# Patient Record
Sex: MALE | Race: WHITE | NOT HISPANIC OR LATINO | URBAN - METROPOLITAN AREA
[De-identification: names, ages, dates, MRNs, and addresses within clinical notes are randomized per-mention and may not be internally consistent; named-entity substitution may affect disease eponyms.]

---

## 2023-05-30 ENCOUNTER — TELEPHONE (OUTPATIENT)
Dept: PRIMARY CARE | Facility: CLINIC | Age: 73
End: 2023-05-30
Payer: MEDICARE

## 2023-05-30 DIAGNOSIS — N40.1 BENIGN LOCALIZED PROSTATIC HYPERPLASIA WITH LOWER URINARY TRACT SYMPTOMS (LUTS): Primary | ICD-10-CM

## 2023-06-07 ENCOUNTER — OFFICE VISIT (OUTPATIENT)
Dept: PRIMARY CARE | Facility: CLINIC | Age: 73
End: 2023-06-07
Payer: MEDICARE

## 2023-06-07 DIAGNOSIS — N40.1 BENIGN LOCALIZED PROSTATIC HYPERPLASIA WITH LOWER URINARY TRACT SYMPTOMS (LUTS): ICD-10-CM

## 2023-06-07 DIAGNOSIS — K40.90 INGUINAL HERNIA WITHOUT OBSTRUCTION OR GANGRENE, RECURRENCE NOT SPECIFIED, UNSPECIFIED LATERALITY: Primary | ICD-10-CM

## 2023-06-07 PROCEDURE — 99214 OFFICE O/P EST MOD 30 MIN: CPT | Performed by: INTERNAL MEDICINE

## 2023-06-07 RX ORDER — TAMSULOSIN HYDROCHLORIDE 0.4 MG/1
0.4 CAPSULE ORAL DAILY
Qty: 30 CAPSULE | Refills: 11 | Status: SHIPPED | OUTPATIENT
Start: 2023-06-07 | End: 2023-12-01 | Stop reason: SDUPTHER

## 2023-06-07 ASSESSMENT — ENCOUNTER SYMPTOMS
RESPIRATORY NEGATIVE: 1
DYSURIA: 0
FREQUENCY: 1
DIFFICULTY URINATING: 1
CONSTITUTIONAL NEGATIVE: 1
EYES NEGATIVE: 1
CARDIOVASCULAR NEGATIVE: 1
GASTROINTESTINAL NEGATIVE: 1

## 2023-06-07 NOTE — PROGRESS NOTES
Subjective   Patient ID: Raji Duggan is a 72 y.o. male who presents for   Patient endorses noticing swelling in groin on the left side started about two weeks ago endorses it as being just above the testicle.  Waxes and wanes and notes that it's more pronounced when laying down.  Noted some associated increased urinary frequency, less stream, denies pain.  Denies any trauma to the area and no strenuous exercise.  Endorses playing tennis and denies any heavy lifting.  No fevers, chills, or night sweats.  No erythema.  No recent illness.  Denies edema, changes in sensation.        Review of Systems   Constitutional: Negative.    HENT: Negative.     Eyes: Negative.    Respiratory: Negative.     Cardiovascular: Negative.    Gastrointestinal: Negative.    Genitourinary:  Positive for difficulty urinating and frequency. Negative for dysuria.     Objective   Vitals  BP - 165/80 and 169/80 on repeat    Physical Exam  Exam conducted with a chaperone present.   Constitutional:       Appearance: Normal appearance. He is normal weight.   HENT:      Head: Normocephalic and atraumatic.   Eyes:      Extraocular Movements: Extraocular movements intact.      Conjunctiva/sclera: Conjunctivae normal.   Cardiovascular:      Rate and Rhythm: Normal rate and regular rhythm.   Pulmonary:      Effort: Pulmonary effort is normal.      Breath sounds: Normal breath sounds.   Abdominal:      General: Bowel sounds are normal.   Genitourinary:     Comments: Exam with Chaperone done by Dr. Morales (left side groin axis swelling, not able to be visualized on general observation, no erythema, bulging, or tenderness to palpation)  Musculoskeletal:         General: Normal range of motion.      Cervical back: Normal range of motion.   Neurological:      General: No focal deficit present.      Mental Status: He is alert.     Lab Results   Component Value Date    WBC 7.4 11/29/2022    HGB 15.0 11/29/2022    HCT 43.0 11/29/2022    MCV 91 11/29/2022    PLT  201 11/29/2022     Lab Results   Component Value Date    GLUCOSE 81 11/29/2022    CALCIUM 9.0 11/29/2022     11/29/2022    K 4.4 11/29/2022    CO2 26 11/29/2022     11/29/2022    BUN 16 11/29/2022    CREATININE 0.92 11/29/2022       Assessment/Plan   71 yo male with pmhx of urinary frequency and GERD presenting with concern for groin swelling exam concerning for inguinal hernia.  Also noted increasing urinary frequency, slowing stream, pending uro eval of prostate.  Hypertensive to the 160s.    #Groin Swelling c/f inguinal hernia  -referral to general surg for eval    #Urinary Frequency  -pending urology appointment on the 27th  -will prescribe trial of flomax to see if helps symptoms    #HTN  -systolics in the 160s  -counseled on obtaining a home bp monitor and checking at home  -will reassess home measurements and start a single agent     RTC in Nov.

## 2023-06-07 NOTE — PATIENT INSTRUCTIONS
Raji,     Start tamsulosin (Flomax) 1 pill at bedtime until you see urology (Dr. Pineda).   Call the referral line or await a call from  regarding scheduling general surgery visit with Dr. Richard Eaton for what I suspect is an inguinal hernia.   Your blood pressure elevation today has me thinking you may have a new diagnosis of hypertension.  a BP monitor (not a wrist one, but an electronic one) - [employer] is a good brand - check it first thing in the AM a few times a week. If the top number is consistently >140 or the bottom number >85, I would start a low dose once a day BP med at night.   See me for a physical in November!       CL

## 2023-06-10 NOTE — PROGRESS NOTES
I reviewed with the resident the medical history and the resident’s findings on physical examination.  I discussed with the resident the patient’s diagnosis and concur with the treatment plan as documented in the resident note.             Raji Duggan is a 73 yo M presents for acute visit.     1. LUTS   -has uro visit impending   -no meds historically     2. Inguinal mass     3. R shoulder pain s/p ortho eval 9.22     #HM   -flu 2022 UTD, pneumovax UTD, shingrix x2 UTD, tdap 2016   -due screen labs   -cscope 3.2022 ([facility]) - TAs   -EGD normal 3.2022         Farhana Tsai MD

## 2023-08-11 ENCOUNTER — HOSPITAL ENCOUNTER (OUTPATIENT)
Dept: DATA CONVERSION | Facility: HOSPITAL | Age: 73
End: 2023-08-11
Attending: SURGERY | Admitting: SURGERY
Payer: MEDICARE

## 2023-08-11 DIAGNOSIS — G89.29 CHRONIC KNEE PAIN, UNSPECIFIED LATERALITY: Primary | ICD-10-CM

## 2023-08-11 DIAGNOSIS — M25.569 CHRONIC KNEE PAIN, UNSPECIFIED LATERALITY: Primary | ICD-10-CM

## 2023-08-11 DIAGNOSIS — K40.20 BILATERAL INGUINAL HERNIA, WITHOUT OBSTRUCTION OR GANGRENE, NOT SPECIFIED AS RECURRENT: ICD-10-CM

## 2023-08-13 ENCOUNTER — HOSPITAL ENCOUNTER (OUTPATIENT)
Dept: DATA CONVERSION | Facility: HOSPITAL | Age: 73
Discharge: HOME | End: 2023-08-13

## 2023-08-13 DIAGNOSIS — R33.9 RETENTION OF URINE, UNSPECIFIED: ICD-10-CM

## 2023-08-13 DIAGNOSIS — Z98.890 OTHER SPECIFIED POSTPROCEDURAL STATES: ICD-10-CM

## 2023-08-13 LAB
ANION GAP SERPL CALCULATED.3IONS-SCNC: 15 MMOL/L (ref 0–19)
BASOPHILS # BLD AUTO: 0.01 K/UL (ref 0–0.22)
BASOPHILS NFR BLD AUTO: 0.1 % (ref 0–1)
BILIRUB UR QL STRIP.AUTO: NEGATIVE
BUN SERPL-MCNC: 14 MG/DL (ref 8–25)
BUN/CREAT SERPL: 15.6 RATIO (ref 8–21)
CALCIUM SERPL-MCNC: 9.7 MG/DL (ref 8.5–10.4)
CHLORIDE SERPL-SCNC: 103 MMOL/L (ref 97–107)
CLARITY UR: CLEAR
CO2 SERPL-SCNC: 20 MMOL/L (ref 24–31)
COLOR UR: YELLOW
CREAT SERPL-MCNC: 0.9 MG/DL (ref 0.4–1.6)
DEPRECATED RDW RBC AUTO: 40.1 FL (ref 37–54)
DIFFERENTIAL METHOD BLD: ABNORMAL
EOSINOPHIL # BLD AUTO: 0 K/UL (ref 0–0.45)
EOSINOPHIL NFR BLD: 0 % (ref 0–3)
ERYTHROCYTE [DISTWIDTH] IN BLOOD BY AUTOMATED COUNT: 12.2 % (ref 11.7–15)
GFR SERPL CREATININE-BSD FRML MDRD: 91 ML/MIN/1.73 M2
GLUCOSE SERPL-MCNC: 138 MG/DL (ref 65–99)
GLUCOSE UR STRIP.AUTO-MCNC: NEGATIVE MG/DL
HCT VFR BLD AUTO: 41.4 % (ref 41–50)
HGB BLD-MCNC: 14.6 GM/DL (ref 13.5–16.5)
HGB UR QL STRIP.AUTO: ABNORMAL /HPF (ref 0–3)
HGB UR QL: ABNORMAL
IMM GRANULOCYTES # BLD AUTO: 0.01 K/UL (ref 0–0.1)
KETONES UR QL STRIP.AUTO: ABNORMAL
LEUKOCYTE ESTERASE UR QL STRIP.AUTO: NEGATIVE
LYMPHOCYTES # BLD AUTO: 2.22 K/UL (ref 1.2–3.2)
LYMPHOCYTES NFR BLD MANUAL: 22.2 % (ref 20–40)
MCH RBC QN AUTO: 31 PG (ref 26–34)
MCHC RBC AUTO-ENTMCNC: 35.3 % (ref 31–37)
MCV RBC AUTO: 87.9 FL (ref 80–100)
MICROSCOPIC (UA): ABNORMAL
MONOCYTES # BLD AUTO: 0.84 K/UL (ref 0–0.8)
MONOCYTES NFR BLD MANUAL: 8.4 % (ref 0–8)
NEUTROPHILS # BLD AUTO: 6.91 K/UL
NEUTROPHILS # BLD AUTO: 6.91 K/UL (ref 1.8–7.7)
NEUTROPHILS.IMMATURE NFR BLD: 0.1 % (ref 0–1)
NEUTS SEG NFR BLD: 69.2 % (ref 50–70)
NITRITE UR QL STRIP.AUTO: NEGATIVE
PH UR STRIP.AUTO: 5.5 [PH] (ref 4.6–8)
PLATELET # BLD AUTO: 189 K/UL (ref 150–450)
PMV BLD AUTO: 10 CU (ref 7–12.6)
POTASSIUM SERPL-SCNC: 3.9 MMOL/L (ref 3.4–5.1)
PROT UR STRIP.AUTO-MCNC: NEGATIVE MG/DL
RBC # BLD AUTO: 4.71 M/UL (ref 4.5–5.5)
SODIUM SERPL-SCNC: 138 MMOL/L (ref 133–145)
SP GR UR STRIP.AUTO: 1.02 (ref 1–1.03)
URINE CULTURE: ABNORMAL
UROBILINOGEN UR QL STRIP.AUTO: 0.2 MG/DL (ref 0–1)
WBC # BLD AUTO: 10 K/UL (ref 4.5–11)
WBC #/AREA URNS AUTO: ABNORMAL /HPF (ref 0–3)

## 2023-08-14 ENCOUNTER — HOSPITAL ENCOUNTER (OUTPATIENT)
Dept: DATA CONVERSION | Facility: HOSPITAL | Age: 73
Discharge: HOME | End: 2023-08-14

## 2023-08-14 DIAGNOSIS — Z79.899 OTHER LONG TERM (CURRENT) DRUG THERAPY: ICD-10-CM

## 2023-08-14 DIAGNOSIS — R33.9 RETENTION OF URINE, UNSPECIFIED: ICD-10-CM

## 2023-08-22 ENCOUNTER — PATIENT OUTREACH (OUTPATIENT)
Dept: PRIMARY CARE | Facility: CLINIC | Age: 73
End: 2023-08-22
Payer: MEDICARE

## 2023-08-22 DIAGNOSIS — R33.9 URINARY RETENTION: ICD-10-CM

## 2023-08-22 NOTE — PROGRESS NOTES
TCM complete.  Discharge date 8/18/23   2 attempts were made to reach patient to assess needs.   No return call as of this note.   If patient schedules follow up within 14 days of discharge, visit is TCM billable.  Message sent to practice clinical pool to reach out to patient and schedule an appointment within 7-13 days from discharge date.    If patient meets criteria for moderately complex & has follow-up within 14 days-can bill [id number].   If patient meets criteria for highly complex & has follow-up visit within 7 days-can bill [id number].    *virtual follow up needs modifier added (95 or GT)   *AWV AND TCM CAN BE BILLED TOGETHER WITH 25 MODIFIER

## 2023-09-29 VITALS
HEART RATE: 48 BPM | TEMPERATURE: 97 F | RESPIRATION RATE: 16 BRPM | BODY MASS INDEX: 25.28 KG/M2 | DIASTOLIC BLOOD PRESSURE: 67 MMHG | WEIGHT: 180.56 LBS | SYSTOLIC BLOOD PRESSURE: 179 MMHG | HEIGHT: 71 IN

## 2023-09-30 NOTE — H&P
History of Present Illness:   History Present Illness:  Reason for surgery: bilateral inguina hernia   HPI:    72-year-old gentleman with bilateral inguinal hernias.    Past medical history review of systems no previous abdominal surgeries.  Mild reflux and prostatism symptoms    Allergies:        Allergies:  ·  No Known Allergies :     Home Medication Review:   Home Medications Reviewed: yes     Impression/Procedure:   ·  Impression and Planned Procedure: Plan is for laparoscopic bilateral inguinal hernia repair       ERAS (Enhanced Recovery After Surgery):  ·  ERAS Patient: no       Vital Signs:  Temperature C: 36.1 degrees C   Temperature F: 96.9 degrees F   Heart Rate: 48 beats per minute   Respiratory Rate: 16 breath per minute   Blood Pressure Systolic: 179 mm/Hg   Blood Pressure Diastolic: 67 mm/Hg     Physical Exam by System:    Eyes: no icterus   Respiratory/Thorax: normal respiratory effort   Cardiovascular: no significant problems   Gastrointestinal: No scars, no hepatosplenomegaly   Genitourinary: Lateral inguinal hernias left greater  than right   Musculoskeletal: moves all extremities   Neurological: moves all extremities     Consent:   COVID-19 Consent:  ·  COVID-19 Risk Consent Surgeon has reviewed key risks related to the risk of beena COVID-19 and if they contract COVID-19 what the risks are.       Electronic Signatures:  Melvin Eaton)  (Signed 11-Aug-2023 08:47)   Authored: History of Present Illness, Allergies, Home  Medication Review, Impression/Procedure, ERAS, Physical Exam, Consent, Note Completion      Last Updated: 11-Aug-2023 08:47 by Melvin Eaton)

## 2023-10-01 NOTE — OP NOTE
Post Operative Note:     PreOp Diagnosis: Bilateral inguinal hernias   Post-Procedure Diagnosis: Bilateral inguinal hernias   Procedure: 1. Laparoscopic bilateral inguinal hernia  repair   Surgeon: Uma   Resident/Fellow/Other Assistant: Nicolás   Anesthesia: GETA   I.V. Fluids: 200   Estimated Blood Loss (mL): 2   Specimen: no   Complications: none   Findings: Bilateral direct inguinal hernias   Patient Returned To/Condition: PACU to home     Attestation:   Note Completion:  I am a: Resident/Fellow   Attending Attestation I was present for the entire procedure          Electronic Signatures:  Storm Monzon (Resident))  (Signed 11-Aug-2023 11:12)   Authored: Post Operative Note, Note Completion  Melvin Eaton)  (Signed 11-Aug-2023 11:25)   Authored: Note Completion   Co-Signer: Post Operative Note, Note Completion      Last Updated: 11-Aug-2023 11:25 by Melvin Eaton)    26-Apr-2022 12:16

## 2023-11-03 DIAGNOSIS — R49.0 DYSPHONIA: ICD-10-CM

## 2023-11-03 RX ORDER — OMEPRAZOLE 40 MG/1
40 CAPSULE, DELAYED RELEASE ORAL EVERY MORNING
Qty: 90 CAPSULE | Refills: 2 | Status: SHIPPED | OUTPATIENT
Start: 2023-11-03 | End: 2023-12-01 | Stop reason: SDUPTHER

## 2023-11-16 ENCOUNTER — OFFICE VISIT (OUTPATIENT)
Dept: PRIMARY CARE | Facility: CLINIC | Age: 73
End: 2023-11-16
Payer: MEDICARE

## 2023-11-16 VITALS
OXYGEN SATURATION: 99 % | DIASTOLIC BLOOD PRESSURE: 82 MMHG | SYSTOLIC BLOOD PRESSURE: 136 MMHG | BODY MASS INDEX: 25.73 KG/M2 | HEART RATE: 52 BPM | WEIGHT: 184.6 LBS

## 2023-11-16 DIAGNOSIS — J01.00 ACUTE NON-RECURRENT MAXILLARY SINUSITIS: Primary | ICD-10-CM

## 2023-11-16 PROCEDURE — 1036F TOBACCO NON-USER: CPT | Performed by: INTERNAL MEDICINE

## 2023-11-16 PROCEDURE — 1159F MED LIST DOCD IN RCRD: CPT | Performed by: INTERNAL MEDICINE

## 2023-11-16 PROCEDURE — 1160F RVW MEDS BY RX/DR IN RCRD: CPT | Performed by: INTERNAL MEDICINE

## 2023-11-16 PROCEDURE — 1126F AMNT PAIN NOTED NONE PRSNT: CPT | Performed by: INTERNAL MEDICINE

## 2023-11-16 PROCEDURE — 99213 OFFICE O/P EST LOW 20 MIN: CPT | Performed by: INTERNAL MEDICINE

## 2023-11-16 RX ORDER — DOXYCYCLINE 100 MG/1
100 CAPSULE ORAL 2 TIMES DAILY
Qty: 28 CAPSULE | Refills: 0 | Status: SHIPPED | OUTPATIENT
Start: 2023-11-16 | End: 2023-11-30

## 2023-11-16 ASSESSMENT — ENCOUNTER SYMPTOMS
CONSTITUTIONAL NEGATIVE: 1
SINUS COMPLAINT: 1
SINUS PRESSURE: 1
SORE THROAT: 1

## 2023-11-16 ASSESSMENT — PATIENT HEALTH QUESTIONNAIRE - PHQ9
2. FEELING DOWN, DEPRESSED OR HOPELESS: NOT AT ALL
SUM OF ALL RESPONSES TO PHQ9 QUESTIONS 1 AND 2: 0
1. LITTLE INTEREST OR PLEASURE IN DOING THINGS: NOT AT ALL

## 2023-11-16 NOTE — PROGRESS NOTES
Patient ID: Raji Duggan is a 73 y.o. male who presents for Sinus Problem (Head congestion, fatigue ,body aches; was positive for covid a few weeks ago).    /82   Pulse 52   Wt 83.7 kg (184 lb 9.6 oz)   SpO2 99%   BMI 25.73 kg/m²     Sinus Problem  Associated symptoms include congestion, sinus pressure and a sore throat.         I had cold feeling last Monday , he was positive for covid 10/27/2023   , pt have head congestion , fatigue , bodyaches , I have nasal congestion   Stuffiness, post nasal drip ,  sinus pressure , I am not congested in my chest , appetite good , no headache     Subjective     Review of Systems   Constitutional: Negative.    HENT:  Positive for congestion, postnasal drip, sinus pressure and sore throat.    All other systems reviewed and are negative.      Objective     Physical Exam  Vitals and nursing note reviewed.         Lab Results   Component Value Date    WBC 10.6 08/15/2023    HGB 14.6 08/15/2023    HCT 39.9 (L) 08/15/2023    MCV 86 08/15/2023     08/15/2023           Problem List Items Addressed This Visit    None  Visit Diagnoses       Acute non-recurrent maxillary sinusitis    -  Primary    Relevant Medications    doxycycline (Vibramycin) 100 mg capsule               A/P         DOXYCYCLINE 100MG 1 PILL BID FOR 7 DAYS   ADVISED TO DRINK MORE FLUID   FOLLOW UP WITH PCP IF NOT BETTER

## 2023-12-01 ENCOUNTER — OFFICE VISIT (OUTPATIENT)
Dept: PRIMARY CARE | Facility: CLINIC | Age: 73
End: 2023-12-01
Payer: MEDICARE

## 2023-12-01 ENCOUNTER — ANCILLARY PROCEDURE (OUTPATIENT)
Dept: RADIOLOGY | Facility: CLINIC | Age: 73
End: 2023-12-01
Payer: MEDICARE

## 2023-12-01 ENCOUNTER — LAB (OUTPATIENT)
Dept: LAB | Facility: LAB | Age: 73
End: 2023-12-01
Payer: MEDICARE

## 2023-12-01 VITALS — SYSTOLIC BLOOD PRESSURE: 142 MMHG | DIASTOLIC BLOOD PRESSURE: 82 MMHG | BODY MASS INDEX: 25.87 KG/M2 | WEIGHT: 185.6 LBS

## 2023-12-01 DIAGNOSIS — R73.9 BORDERLINE HYPERGLYCEMIA: ICD-10-CM

## 2023-12-01 DIAGNOSIS — I10 BENIGN ESSENTIAL HYPERTENSION: Primary | ICD-10-CM

## 2023-12-01 DIAGNOSIS — E78.5 DYSLIPIDEMIA: ICD-10-CM

## 2023-12-01 DIAGNOSIS — N40.1 BENIGN LOCALIZED PROSTATIC HYPERPLASIA WITH LOWER URINARY TRACT SYMPTOMS (LUTS): ICD-10-CM

## 2023-12-01 DIAGNOSIS — Z12.5 ENCOUNTER FOR PROSTATE CANCER SCREENING: ICD-10-CM

## 2023-12-01 DIAGNOSIS — M25.561 RIGHT KNEE PAIN, UNSPECIFIED CHRONICITY: ICD-10-CM

## 2023-12-01 DIAGNOSIS — R49.0 DYSPHONIA: ICD-10-CM

## 2023-12-01 DIAGNOSIS — Z23 ENCOUNTER FOR VACCINATION: ICD-10-CM

## 2023-12-01 DIAGNOSIS — I10 BENIGN ESSENTIAL HYPERTENSION: ICD-10-CM

## 2023-12-01 LAB
ALBUMIN SERPL BCP-MCNC: 4.4 G/DL (ref 3.4–5)
ALP SERPL-CCNC: 80 U/L (ref 33–136)
ALT SERPL W P-5'-P-CCNC: 29 U/L (ref 10–52)
ANION GAP SERPL CALC-SCNC: 9 MMOL/L (ref 10–20)
AST SERPL W P-5'-P-CCNC: 28 U/L (ref 9–39)
BASOPHILS # BLD AUTO: 0.03 X10*3/UL (ref 0–0.1)
BASOPHILS NFR BLD AUTO: 0.4 %
BILIRUB SERPL-MCNC: 1.8 MG/DL (ref 0–1.2)
BUN SERPL-MCNC: 14 MG/DL (ref 6–23)
CALCIUM SERPL-MCNC: 9.2 MG/DL (ref 8.6–10.6)
CHLORIDE SERPL-SCNC: 107 MMOL/L (ref 98–107)
CHOLEST SERPL-MCNC: 226 MG/DL (ref 0–199)
CHOLESTEROL/HDL RATIO: 3.3
CO2 SERPL-SCNC: 28 MMOL/L (ref 21–32)
CREAT SERPL-MCNC: 1 MG/DL (ref 0.5–1.3)
EOSINOPHIL # BLD AUTO: 0.08 X10*3/UL (ref 0–0.4)
EOSINOPHIL NFR BLD AUTO: 1.1 %
ERYTHROCYTE [DISTWIDTH] IN BLOOD BY AUTOMATED COUNT: 12.7 % (ref 11.5–14.5)
EST. AVERAGE GLUCOSE BLD GHB EST-MCNC: 103 MG/DL
GFR SERPL CREATININE-BSD FRML MDRD: 79 ML/MIN/1.73M*2
GLUCOSE SERPL-MCNC: 86 MG/DL (ref 74–99)
HBA1C MFR BLD: 5.2 %
HCT VFR BLD AUTO: 45.7 % (ref 41–52)
HDLC SERPL-MCNC: 67.8 MG/DL
HGB BLD-MCNC: 15.3 G/DL (ref 13.5–17.5)
IMM GRANULOCYTES # BLD AUTO: 0.02 X10*3/UL (ref 0–0.5)
IMM GRANULOCYTES NFR BLD AUTO: 0.3 % (ref 0–0.9)
LDLC SERPL CALC-MCNC: 135 MG/DL
LYMPHOCYTES # BLD AUTO: 2.82 X10*3/UL (ref 0.8–3)
LYMPHOCYTES NFR BLD AUTO: 40.2 %
MCH RBC QN AUTO: 30.4 PG (ref 26–34)
MCHC RBC AUTO-ENTMCNC: 33.5 G/DL (ref 32–36)
MCV RBC AUTO: 91 FL (ref 80–100)
MONOCYTES # BLD AUTO: 0.81 X10*3/UL (ref 0.05–0.8)
MONOCYTES NFR BLD AUTO: 11.6 %
NEUTROPHILS # BLD AUTO: 3.25 X10*3/UL (ref 1.6–5.5)
NEUTROPHILS NFR BLD AUTO: 46.4 %
NON HDL CHOLESTEROL: 158 MG/DL (ref 0–149)
NRBC BLD-RTO: 0 /100 WBCS (ref 0–0)
PLATELET # BLD AUTO: 191 X10*3/UL (ref 150–450)
POTASSIUM SERPL-SCNC: 4.8 MMOL/L (ref 3.5–5.3)
PROT SERPL-MCNC: 6.4 G/DL (ref 6.4–8.2)
RBC # BLD AUTO: 5.03 X10*6/UL (ref 4.5–5.9)
SODIUM SERPL-SCNC: 139 MMOL/L (ref 136–145)
TRIGL SERPL-MCNC: 115 MG/DL (ref 0–149)
VLDL: 23 MG/DL (ref 0–40)
WBC # BLD AUTO: 7 X10*3/UL (ref 4.4–11.3)

## 2023-12-01 PROCEDURE — 73562 X-RAY EXAM OF KNEE 3: CPT | Mod: RIGHT SIDE | Performed by: RADIOLOGY

## 2023-12-01 PROCEDURE — G0439 PPPS, SUBSEQ VISIT: HCPCS | Performed by: INTERNAL MEDICINE

## 2023-12-01 PROCEDURE — 36415 COLL VENOUS BLD VENIPUNCTURE: CPT

## 2023-12-01 PROCEDURE — 1170F FXNL STATUS ASSESSED: CPT | Performed by: INTERNAL MEDICINE

## 2023-12-01 PROCEDURE — 80061 LIPID PANEL: CPT

## 2023-12-01 PROCEDURE — 1160F RVW MEDS BY RX/DR IN RCRD: CPT | Performed by: INTERNAL MEDICINE

## 2023-12-01 PROCEDURE — 83036 HEMOGLOBIN GLYCOSYLATED A1C: CPT

## 2023-12-01 PROCEDURE — 1159F MED LIST DOCD IN RCRD: CPT | Performed by: INTERNAL MEDICINE

## 2023-12-01 PROCEDURE — 99214 OFFICE O/P EST MOD 30 MIN: CPT | Performed by: INTERNAL MEDICINE

## 2023-12-01 PROCEDURE — G0103 PSA SCREENING: HCPCS

## 2023-12-01 PROCEDURE — 80053 COMPREHEN METABOLIC PANEL: CPT

## 2023-12-01 PROCEDURE — 1036F TOBACCO NON-USER: CPT | Performed by: INTERNAL MEDICINE

## 2023-12-01 PROCEDURE — 3079F DIAST BP 80-89 MM HG: CPT | Performed by: INTERNAL MEDICINE

## 2023-12-01 PROCEDURE — 85025 COMPLETE CBC W/AUTO DIFF WBC: CPT

## 2023-12-01 PROCEDURE — 3077F SYST BP >= 140 MM HG: CPT | Performed by: INTERNAL MEDICINE

## 2023-12-01 PROCEDURE — G0008 ADMIN INFLUENZA VIRUS VAC: HCPCS | Performed by: INTERNAL MEDICINE

## 2023-12-01 PROCEDURE — 73562 X-RAY EXAM OF KNEE 3: CPT | Mod: RT

## 2023-12-01 PROCEDURE — 1126F AMNT PAIN NOTED NONE PRSNT: CPT | Performed by: INTERNAL MEDICINE

## 2023-12-01 PROCEDURE — 90662 IIV NO PRSV INCREASED AG IM: CPT | Performed by: INTERNAL MEDICINE

## 2023-12-01 RX ORDER — OMEPRAZOLE 40 MG/1
40 CAPSULE, DELAYED RELEASE ORAL EVERY MORNING
Qty: 90 CAPSULE | Refills: 0 | Status: SHIPPED | OUTPATIENT
Start: 2023-12-01

## 2023-12-01 RX ORDER — AMLODIPINE BESYLATE AND ATORVASTATIN CALCIUM 5; 20 MG/1; MG/1
1 TABLET, FILM COATED ORAL DAILY
Qty: 90 TABLET | Refills: 0 | Status: SHIPPED | OUTPATIENT
Start: 2023-12-01 | End: 2023-12-01 | Stop reason: SDUPTHER

## 2023-12-01 RX ORDER — TAMSULOSIN HYDROCHLORIDE 0.4 MG/1
0.4 CAPSULE ORAL DAILY
Qty: 180 CAPSULE | Refills: 0 | Status: SHIPPED | OUTPATIENT
Start: 2023-12-01 | End: 2023-12-19 | Stop reason: SDUPTHER

## 2023-12-01 RX ORDER — AMLODIPINE BESYLATE AND ATORVASTATIN CALCIUM 5; 20 MG/1; MG/1
1 TABLET, FILM COATED ORAL DAILY
Qty: 90 TABLET | Refills: 0 | Status: SHIPPED | OUTPATIENT
Start: 2023-12-01 | End: 2024-01-11 | Stop reason: ALTCHOICE

## 2023-12-01 ASSESSMENT — PATIENT HEALTH QUESTIONNAIRE - PHQ9
1. LITTLE INTEREST OR PLEASURE IN DOING THINGS: NOT AT ALL
SUM OF ALL RESPONSES TO PHQ9 QUESTIONS 1 AND 2: 0
2. FEELING DOWN, DEPRESSED OR HOPELESS: NOT AT ALL

## 2023-12-01 ASSESSMENT — ACTIVITIES OF DAILY LIVING (ADL)
MANAGING_FINANCES: INDEPENDENT
TAKING_MEDICATION: INDEPENDENT
GROCERY_SHOPPING: INDEPENDENT
DRESSING: INDEPENDENT
BATHING: INDEPENDENT
DOING_HOUSEWORK: INDEPENDENT

## 2023-12-01 ASSESSMENT — ENCOUNTER SYMPTOMS
OCCASIONAL FEELINGS OF UNSTEADINESS: 0
DEPRESSION: 0
LOSS OF SENSATION IN FEET: 0

## 2023-12-01 NOTE — PATIENT INSTRUCTIONS
Raji,     We are starting a new pill at a very low dosage for blood pressure and cholesterol combined - let me know if any issues with it, otherwise it's fine to just see me in a year.   We are doing full labs today and I'll be in touch through [employer].   We are doing a right knee x-ray which you can do in radiology and I'll also be in touch with those results.   Flu shot today   See me in a year!     CL

## 2023-12-01 NOTE — PROGRESS NOTES
Raji Duggan is a 74 yo M presents for [facility].     *Pain in left 3rd toe  -Only at night  -Getting up and walking helps  -Describes the pain as nerve pain    *Pain in right knee and right thumb  -Tylenol helps   [ ] Right knee xray       1. LUTS   -follows up with urology  -Tamsulosin 0.8 mg daily      2. Inguinal mass  -Bilateral inguinal hernia repair done 8/11/2023 by Dr. Storm Monzon      3. HTN/DLD  - Taking BP at home-typically 140s systolic   - ASCVD 23.6%  [ ] Start Amlodipine/atorvastatin 5-20 mg       5. R shoulder pain s/p ortho eval 9.22      #HM   -flu-would like today, pneumovax UTD, shingrix x2 UTD, tdap 2016   -screen labs due   -cscope 3.2022 ([facility]) - TAs   -EGD normal 3.2022     /82   Wt 84.2 kg (185 lb 9.6 oz)   BMI 25.87 kg/m²     Physical Exam  Constitutional:       Appearance: Normal appearance.   HENT:      Head: Normocephalic and atraumatic.      Right Ear: External ear normal.      Left Ear: External ear normal.   Eyes:      Conjunctiva/sclera: Conjunctivae normal.   Cardiovascular:      Rate and Rhythm: Normal rate and regular rhythm.   Pulmonary:      Effort: Pulmonary effort is normal.      Breath sounds: Normal breath sounds.   Skin:     General: Skin is warm and dry.   Neurological:      Mental Status: He is alert.

## 2023-12-04 LAB
PSA FREE MFR SERPL: 21 %
PSA FREE SERPL-MCNC: 0.3 NG/ML
PSA SERPL IA-MCNC: 1.4 NG/ML (ref 0–4)

## 2023-12-04 NOTE — PROGRESS NOTES
Raji Duggan is a 74 yo M presenting for CPE/MCR.     1. Toe pain at night   -discussed likely from spine DJD, mild in severity and very infrequent   -watchful waiting   -if progresses consider gabapentin qhs     2. R knee pain likely OA   -no h.o plaibn films but suspected OA   -mild in severity and resolving conservatively   -will obtain R knee plain films today     3. R thumb OA   -defer plain films, clear OA   -consider hand referral in the future if ever at the point of interest in injxn     4. Skin lesion s/p bx, follows with derm     5. LUTS   -FU uro   -tamsulosin 0.8 qhs     6. Inguinal mass s/p hernia repair with Dr. Eaton     7. DLD with elevated ASCVD and preHTN vs HTN no meds       #HM   -flu 2022 UTD, pneumovax UTD, shingrix x2 UTD, tdap 2016   -due screen labs   -cscope 3.2022 ([facility]) - TAs   -EGD normal 3.2022

## 2023-12-19 ENCOUNTER — OFFICE VISIT (OUTPATIENT)
Dept: UROLOGY | Facility: CLINIC | Age: 73
End: 2023-12-19
Payer: MEDICARE

## 2023-12-19 VITALS — WEIGHT: 189.4 LBS | TEMPERATURE: 97 F | HEIGHT: 71 IN | BODY MASS INDEX: 26.52 KG/M2

## 2023-12-19 DIAGNOSIS — R33.9 INCOMPLETE BLADDER EMPTYING: ICD-10-CM

## 2023-12-19 DIAGNOSIS — N40.1 BENIGN LOCALIZED PROSTATIC HYPERPLASIA WITH LOWER URINARY TRACT SYMPTOMS (LUTS): Primary | ICD-10-CM

## 2023-12-19 PROCEDURE — 51798 US URINE CAPACITY MEASURE: CPT | Performed by: UROLOGY

## 2023-12-19 PROCEDURE — 99214 OFFICE O/P EST MOD 30 MIN: CPT | Performed by: UROLOGY

## 2023-12-19 PROCEDURE — 1160F RVW MEDS BY RX/DR IN RCRD: CPT | Performed by: UROLOGY

## 2023-12-19 PROCEDURE — 1159F MED LIST DOCD IN RCRD: CPT | Performed by: UROLOGY

## 2023-12-19 PROCEDURE — 1036F TOBACCO NON-USER: CPT | Performed by: UROLOGY

## 2023-12-19 PROCEDURE — 1126F AMNT PAIN NOTED NONE PRSNT: CPT | Performed by: UROLOGY

## 2023-12-19 RX ORDER — TAMSULOSIN HYDROCHLORIDE 0.4 MG/1
0.8 CAPSULE ORAL DAILY
Qty: 180 CAPSULE | Refills: 3 | Status: SHIPPED | OUTPATIENT
Start: 2023-12-19 | End: 2024-05-23 | Stop reason: HOSPADM

## 2023-12-19 ASSESSMENT — PAIN SCALES - GENERAL: PAINLEVEL: 0-NO PAIN

## 2023-12-19 NOTE — PROGRESS NOTES
Subjective   Raji Duggan is a 73 y.o. male with history of urinary retention requiring Briggs catheter. Patient reports improvement in urinary symptoms on Tamsulosin 0.8mg. He has nocturia x1. His urinary stream has improved. He is overall happy with his urinary symptoms.      No past medical history on file.  Past Surgical History:   Procedure Laterality Date    COLONOSCOPY  07/01/2016    Complete Colonoscopy    FOOT SURGERY  03/07/2014    Foot Repair    OTHER SURGICAL HISTORY  03/07/2014    Primary Repair Of Ruptured Achilles Tendon    SHOULDER SURGERY  03/07/2014    Shoulder Surgery    TONSILLECTOMY  03/07/2014    Tonsillectomy     No family history on file.  Current Outpatient Medications   Medication Sig Dispense Refill    amlodipine-atorvastatin (Caduet) 5-20 mg tablet Take 1 tablet by mouth once daily. 90 tablet 0    omeprazole (PriLOSEC) 40 mg DR capsule Take 1 capsule (40 mg) by mouth once daily in the morning. 90 capsule 0    tamsulosin (Flomax) 0.4 mg 24 hr capsule Take 2 capsules (0.8 mg) by mouth once daily. 180 capsule 3     No current facility-administered medications for this visit.     No Known Allergies  Social History     Socioeconomic History    Marital status:      Spouse name: Not on file    Number of children: Not on file    Years of education: Not on file    Highest education level: Not on file   Occupational History    Not on file   Tobacco Use    Smoking status: Never    Smokeless tobacco: Never   Substance and Sexual Activity    Alcohol use: Not on file    Drug use: Not on file    Sexual activity: Not on file   Other Topics Concern    Not on file   Social History Narrative    Not on file     Social Determinants of Health     Financial Resource Strain: Not on file   Food Insecurity: Not on file   Transportation Needs: Not on file   Physical Activity: Not on file   Stress: Not on file   Social Connections: Not on file   Intimate Partner Violence: Not on file   Housing Stability: Not  on file       Review of Systems  Pertinent items are noted in HPI.    Objective     Lab Review  Lab Results   Component Value Date    WBC 7.0 12/01/2023    RBC 5.03 12/01/2023    HGB 15.3 12/01/2023    HCT 45.7 12/01/2023     12/01/2023      Lab Results   Component Value Date    BUN 14 12/01/2023    CREATININE 1.00 12/01/2023      Lab Results   Component Value Date    PSA 1.4 12/01/2023      mL.     Assessment/Plan   Diagnoses and all orders for this visit:  Benign localized prostatic hyperplasia with lower urinary tract symptoms (LUTS)  -     Measure post void residual  -     MR prostate teri boundaries; Future  -     tamsulosin (Flomax) 0.4 mg 24 hr capsule; Take 2 capsules (0.8 mg) by mouth once daily.  Incomplete bladder emptying  -     MR prostate teri boundaries; Future    BPH with incomplete bladder emptying     Patient understands that incomplete bladder emptying, PVR is 332 mL, without any urge to urinate is an early sign of urinary retention which could potentially cause bladder damage over time.     We discussed proceeding with intervention. Patient will think about this, in the mean time we will proceed with prostate MRI to assess prostate anatomy and size.     The risks of cystoscopy were discussed with the patient in great detail, including risk of hematuria, UTI and discomfort. Antibiotic will be prescribed to be taken 1 hour prior to the procedure.     All questions were answered to the patient's satisfaction. Patient agrees with the plan and wishes to proceed. Follow-up will be scheduled appropriately.     Scribed for Dr. Pineda by Loreta Berumen. I , Dr Pineda, have personally reviewed and agreed with the information entered by the Virtual Scribe.     Loreta Berumen

## 2023-12-29 ENCOUNTER — APPOINTMENT (OUTPATIENT)
Dept: RADIOLOGY | Facility: HOSPITAL | Age: 73
End: 2023-12-29
Payer: MEDICARE

## 2024-01-09 ENCOUNTER — HOSPITAL ENCOUNTER (OUTPATIENT)
Dept: RADIOLOGY | Facility: HOSPITAL | Age: 74
Discharge: HOME | End: 2024-01-09
Payer: MEDICARE

## 2024-01-09 DIAGNOSIS — R33.9 INCOMPLETE BLADDER EMPTYING: ICD-10-CM

## 2024-01-09 DIAGNOSIS — N40.1 BENIGN LOCALIZED PROSTATIC HYPERPLASIA WITH LOWER URINARY TRACT SYMPTOMS (LUTS): ICD-10-CM

## 2024-01-09 PROCEDURE — 72197 MRI PELVIS W/O & W/DYE: CPT | Performed by: RADIOLOGY

## 2024-01-09 PROCEDURE — 72197 MRI PELVIS W/O & W/DYE: CPT

## 2024-01-09 PROCEDURE — A9575 INJ GADOTERATE MEGLUMI 0.1ML: HCPCS | Performed by: UROLOGY

## 2024-01-09 PROCEDURE — 76498 UNLISTED MR PROCEDURE: CPT | Performed by: RADIOLOGY

## 2024-01-09 PROCEDURE — 2550000001 HC RX 255 CONTRASTS: Performed by: UROLOGY

## 2024-01-09 RX ORDER — GADOTERATE MEGLUMINE 376.9 MG/ML
17 INJECTION INTRAVENOUS
Status: COMPLETED | OUTPATIENT
Start: 2024-01-09 | End: 2024-01-09

## 2024-01-09 RX ADMIN — GADOTERATE MEGLUMINE 17 ML: 376.9 INJECTION INTRAVENOUS at 15:05

## 2024-01-11 ENCOUNTER — OFFICE VISIT (OUTPATIENT)
Dept: PRIMARY CARE | Facility: CLINIC | Age: 74
End: 2024-01-11
Payer: MEDICARE

## 2024-01-11 VITALS
OXYGEN SATURATION: 96 % | HEART RATE: 47 BPM | SYSTOLIC BLOOD PRESSURE: 134 MMHG | BODY MASS INDEX: 26.46 KG/M2 | DIASTOLIC BLOOD PRESSURE: 67 MMHG | WEIGHT: 189 LBS | HEIGHT: 71 IN

## 2024-01-11 DIAGNOSIS — E78.5 DYSLIPIDEMIA: ICD-10-CM

## 2024-01-11 DIAGNOSIS — I10 BENIGN ESSENTIAL HYPERTENSION: Primary | ICD-10-CM

## 2024-01-11 DIAGNOSIS — M72.2 PLANTAR FASCIITIS, BILATERAL: ICD-10-CM

## 2024-01-11 PROCEDURE — 1036F TOBACCO NON-USER: CPT | Performed by: STUDENT IN AN ORGANIZED HEALTH CARE EDUCATION/TRAINING PROGRAM

## 2024-01-11 PROCEDURE — 1126F AMNT PAIN NOTED NONE PRSNT: CPT | Performed by: STUDENT IN AN ORGANIZED HEALTH CARE EDUCATION/TRAINING PROGRAM

## 2024-01-11 PROCEDURE — 1160F RVW MEDS BY RX/DR IN RCRD: CPT | Performed by: STUDENT IN AN ORGANIZED HEALTH CARE EDUCATION/TRAINING PROGRAM

## 2024-01-11 PROCEDURE — 1159F MED LIST DOCD IN RCRD: CPT | Performed by: STUDENT IN AN ORGANIZED HEALTH CARE EDUCATION/TRAINING PROGRAM

## 2024-01-11 PROCEDURE — 3075F SYST BP GE 130 - 139MM HG: CPT | Performed by: STUDENT IN AN ORGANIZED HEALTH CARE EDUCATION/TRAINING PROGRAM

## 2024-01-11 PROCEDURE — 3078F DIAST BP <80 MM HG: CPT | Performed by: STUDENT IN AN ORGANIZED HEALTH CARE EDUCATION/TRAINING PROGRAM

## 2024-01-11 PROCEDURE — 99213 OFFICE O/P EST LOW 20 MIN: CPT | Performed by: STUDENT IN AN ORGANIZED HEALTH CARE EDUCATION/TRAINING PROGRAM

## 2024-01-11 RX ORDER — ATORVASTATIN CALCIUM 20 MG/1
20 TABLET, FILM COATED ORAL DAILY
Qty: 90 TABLET | Refills: 1 | Status: SHIPPED | OUTPATIENT
Start: 2024-01-11 | End: 2024-04-01 | Stop reason: SDUPTHER

## 2024-01-11 RX ORDER — ACETAMINOPHEN 500 MG
500 TABLET ORAL EVERY 6 HOURS PRN
COMMUNITY
Start: 2023-06-13

## 2024-01-11 NOTE — PATIENT INSTRUCTIONS
STOP your combined Amlodipine-Atorvastatin medication.     New script sent for Atorvastatin alone.     Swelling should improve over next 1-2 weeks.    Continue home blood pressure monitoring  May consider LOSARTAN as a next line medication for you if we continue to hover in the 130/140 range  You can discuss with Dr. Morales upon her return but always reach back out to me if you need anything urgently    Nice to meet you today!    Dr. ROBBINS

## 2024-01-11 NOTE — PROGRESS NOTES
"Subjective   Patient ID: Raji Duggan is a 73 y.o. male who presents for Hypertension.    HPI   ACUTE CONCERNS:   #HTN   - started on Amlodipine 5mg last month  - ankle swelling, 1+ pitting bilaterally   - some pain, bothersome  - no calf pain or tenderness  - no overlying skin changes  - would like to transition off  - has not yet had any noticeable effect on BP   [  ] STOP AMLODIPINE today   [  ] refill for Atorva 20 as lone agent prescribed  [  ] continue home BP monitoring  [  ] follow up with Dr. Morales; discussed Losartan as alternative; he would like to hold off for now and discuss with her upon her return    #Plantar Fascitis:   - supportive care/stretching exercises reviewed  - inserts recommended (patient already has)    CHRONIC MEDICAL CONDITIONS:   #R knee pain likely OA   - mild in severity and resolving conservatively   - R knee plain films with moderate medial compartment osteoarthritis in 12/2023     #R thumb OA   - defer plain films, clear OA   - consider hand referral in the future if ever at the point of interest in injxn     #Skin lesion s/p bx, follows with derm     #LUTS   - FU uro   - tamsulosin 0.8 qhs   - urology, Dr. Pineda  - MRI prostate with BPH changes of transition zone, diffuse non-nodular hypointensities within peripheral zone, without evidence of focally restricted diffusion (PI-RADS2) in 01/2024    #Inguinal mass s/p hernia repair with Dr. Eaton     #DLD with elevated ASCVD   #preHTN   - started on Amlodipine-Atorvastatin 5-20    #GERD  - Omeprazole 40mg daily   - EGD normal 03/2022    #HM   - pneumovax UTD, shingrix x2 UTD, tdap 2016   - screening labs 2023 UTD   - cscope 3.2022 ([facility]) - TAs     Review of Systems    Objective   /67   Pulse (!) 47   Ht 1.803 m (5' 11\")   Wt 85.7 kg (189 lb)   SpO2 96%   BMI 26.36 kg/m²     Physical Exam  General: well appearing  male, NAD  HEENT: NCAT, MMM  CV: bradycardic (known low resting HR, stable upon HR review, very " athletic)  PULM: CTAB  ABD: Soft, NT, ND  EXT: WWP, 1+ pitting edema bilaterally  NEURO: A&Ox4, no gross motor or sensory deficits  PSYCH: pleasant mood, appropriate affect     Assessment/Plan   Problem List Items Addressed This Visit    None  Visit Diagnoses         Codes    Dyslipidemia    -  Primary E78.5    Relevant Medications    atorvastatin (Lipitor) 20 mg tablet               Pito Mckeon MD

## 2024-01-24 ENCOUNTER — TELEMEDICINE (OUTPATIENT)
Dept: UROLOGY | Facility: CLINIC | Age: 74
End: 2024-01-24
Payer: MEDICARE

## 2024-01-24 DIAGNOSIS — Z79.2 NEED FOR PROPHYLACTIC ANTIBIOTIC: ICD-10-CM

## 2024-01-24 DIAGNOSIS — N40.1 BENIGN LOCALIZED PROSTATIC HYPERPLASIA WITH LOWER URINARY TRACT SYMPTOMS (LUTS): Primary | ICD-10-CM

## 2024-01-24 PROCEDURE — 99214 OFFICE O/P EST MOD 30 MIN: CPT | Performed by: UROLOGY

## 2024-01-24 RX ORDER — CEPHALEXIN 500 MG/1
500 CAPSULE ORAL ONCE
Qty: 1 CAPSULE | Refills: 0 | Status: SHIPPED | OUTPATIENT
Start: 2024-01-24 | End: 2024-01-24

## 2024-01-24 NOTE — PROGRESS NOTES
Subjective   Raji Duggan is a 73 y.o. male with history of urinary retention requiring Briggs catheter. Patient presents today to review prostate MRI.     I reviewed prostate MRI from 1/9/2024 which shows a 80g prostate with a 1.7 cm T2 hypointense lesion in the anterior transition zone towards the base in the midline consistent the PI-RADS 3 lesion.         No past medical history on file.  Past Surgical History:   Procedure Laterality Date    COLONOSCOPY  07/01/2016    Complete Colonoscopy    FOOT SURGERY  03/07/2014    Foot Repair    OTHER SURGICAL HISTORY  03/07/2014    Primary Repair Of Ruptured Achilles Tendon    SHOULDER SURGERY  03/07/2014    Shoulder Surgery    TONSILLECTOMY  03/07/2014    Tonsillectomy     No family history on file.  Current Outpatient Medications   Medication Sig Dispense Refill    acetaminophen (Tylenol Extra Strength) 500 mg tablet Take by mouth.      atorvastatin (Lipitor) 20 mg tablet Take 1 tablet (20 mg) by mouth once daily. 90 tablet 1    omeprazole (PriLOSEC) 40 mg DR capsule Take 1 capsule (40 mg) by mouth once daily in the morning. 90 capsule 0    tamsulosin (Flomax) 0.4 mg 24 hr capsule Take 2 capsules (0.8 mg) by mouth once daily. 180 capsule 3     No current facility-administered medications for this visit.     No Known Allergies  Social History     Socioeconomic History    Marital status:      Spouse name: Not on file    Number of children: Not on file    Years of education: Not on file    Highest education level: Not on file   Occupational History    Not on file   Tobacco Use    Smoking status: Never    Smokeless tobacco: Never   Substance and Sexual Activity    Alcohol use: Not on file    Drug use: Not on file    Sexual activity: Not on file   Other Topics Concern    Not on file   Social History Narrative    Not on file     Social Determinants of Health     Financial Resource Strain: Not on file   Food Insecurity: Not on file   Transportation Needs: Not on file    Physical Activity: Not on file   Stress: Not on file   Social Connections: Not on file   Intimate Partner Violence: Not on file   Housing Stability: Not on file       Review of Systems  Pertinent items are noted in HPI.    Objective     Lab Review  Lab Results   Component Value Date    WBC 7.0 12/01/2023    RBC 5.03 12/01/2023    HGB 15.3 12/01/2023    HCT 45.7 12/01/2023     12/01/2023      Lab Results   Component Value Date    BUN 14 12/01/2023    CREATININE 1.00 12/01/2023      Lab Results   Component Value Date    PSA 1.4 12/01/2023         Assessment/Plan   Diagnoses and all orders for this visit:  Benign localized prostatic hyperplasia with lower urinary tract symptoms (LUTS)  Need for prophylactic antibiotic    BPH with incomplete bladder emptying    I reviewed prostate MRI from 1/9/2024 which shows a 80g prostate with a 1.7 cm T2 hypointense lesion in the anterior transition zone towards the base in the midline consistent the PI-RADS 3 lesion.     I discussed with the patient proceeding with evaluation for an outlet procedure.     We will proceed with cystoscopy.     The risks of cystoscopy were discussed with the patient in great detail, including risk of hematuria, UTI and discomfort. Antibiotic will be prescribed to be taken 1 hour prior to the procedure. Patient agrees to proceed.     All questions were answered to the patient's satisfaction. Patient agrees with the plan and wishes to proceed. Follow-up will be scheduled appropriately.     Scribed for Dr. Pineda by Loreta Berumen. I , Dr Pineda, have personally reviewed and agreed with the information entered by the Virtual Scribe.

## 2024-02-09 ENCOUNTER — OFFICE VISIT (OUTPATIENT)
Dept: PRIMARY CARE | Facility: CLINIC | Age: 74
End: 2024-02-09
Payer: MEDICARE

## 2024-02-09 VITALS
SYSTOLIC BLOOD PRESSURE: 168 MMHG | DIASTOLIC BLOOD PRESSURE: 85 MMHG | BODY MASS INDEX: 25.8 KG/M2 | HEART RATE: 45 BPM | OXYGEN SATURATION: 98 % | WEIGHT: 185 LBS

## 2024-02-09 DIAGNOSIS — I10 BENIGN ESSENTIAL HYPERTENSION: Primary | ICD-10-CM

## 2024-02-09 PROCEDURE — 1159F MED LIST DOCD IN RCRD: CPT | Performed by: STUDENT IN AN ORGANIZED HEALTH CARE EDUCATION/TRAINING PROGRAM

## 2024-02-09 PROCEDURE — 3079F DIAST BP 80-89 MM HG: CPT | Performed by: STUDENT IN AN ORGANIZED HEALTH CARE EDUCATION/TRAINING PROGRAM

## 2024-02-09 PROCEDURE — 1126F AMNT PAIN NOTED NONE PRSNT: CPT | Performed by: STUDENT IN AN ORGANIZED HEALTH CARE EDUCATION/TRAINING PROGRAM

## 2024-02-09 PROCEDURE — 1036F TOBACCO NON-USER: CPT | Performed by: STUDENT IN AN ORGANIZED HEALTH CARE EDUCATION/TRAINING PROGRAM

## 2024-02-09 PROCEDURE — 3077F SYST BP >= 140 MM HG: CPT | Performed by: STUDENT IN AN ORGANIZED HEALTH CARE EDUCATION/TRAINING PROGRAM

## 2024-02-09 PROCEDURE — 99213 OFFICE O/P EST LOW 20 MIN: CPT | Performed by: STUDENT IN AN ORGANIZED HEALTH CARE EDUCATION/TRAINING PROGRAM

## 2024-02-09 RX ORDER — LOSARTAN POTASSIUM 50 MG/1
50 TABLET ORAL DAILY
Qty: 90 TABLET | Refills: 1 | Status: SHIPPED | OUTPATIENT
Start: 2024-02-09 | End: 2024-04-01 | Stop reason: SDUPTHER

## 2024-02-09 NOTE — PROGRESS NOTES
Subjective   Patient ID: Raji Duggan is a 73 y.o. male who presents for HTN follow up visit.     HPI   ACUTE CONCERNS:   #HTN   - prior Amlodipine 5mg with intolerance 2/2 ankle swelling, 1+ pitting bilaterally that was bothersome  - STOPPED at last visit; home BP monitoring with persistent elevation above goal 130/80  - last labs from 12/2023 reviewed, reassuring renal function and electrolytes  [  ] start Losartan 25mg daily; uptitrate to 50mg dosing if tolerating well  [  ] continue home BP monitoring   [  ] follow up as planned with repeat labs at that time (CMP, lipid panel)      #DLD with elevated ASCVD    - Atorva 20  [  ] repeat labs after initiation at next visit with Dr. Morales (lipid panel, CMP)     #Plantar Fascitis  - supportive care/stretching exercises reviewed  - inserts recommended (patient already has)    #R knee pain likely OA   - mild in severity and resolving conservatively   - R knee plain films with moderate medial compartment osteoarthritis in 12/2023     #R thumb OA   - defer plain films, clear OA   - consider hand referral in the future if ever at the point of interest in injxn     #Skin lesion s/p bx, follows with derm     #LUTS   - FU uro   - tamsulosin 0.8 qhs   - urology, Dr. Pineda  - MRI prostate with BPH changes of transition zone, diffuse non-nodular hypointensities within peripheral zone, without evidence of focally restricted diffusion (PI-RADS2) in 01/2024    #Inguinal mass s/p hernia repair with Dr. Eaton     #GERD  - Omeprazole 40mg daily   - EGD normal 03/2022    #HM   - pneumovax UTD, shingrix x2 UTD, tdap 2016   - screening labs 2023 UTD   - cscope 3.2022 ([facility]) - TAs     Review of Systems    Objective   /85   Pulse (!) 45   Wt 83.9 kg (185 lb)   SpO2 98%   BMI 25.80 kg/m²     Physical Exam  General: well appearing  male, NAD  HEENT: NCAT, MMM  CV: bradycardic (known low resting HR, stable upon HR review, very athletic)  PULM: CTAB  ABD: Soft, NT,  ND  EXT: WWP, 1+ pitting edema bilaterally  NEURO: A&Ox4, no gross motor or sensory deficits  PSYCH: pleasant mood, appropriate affect     Assessment/Plan   Problem List Items Addressed This Visit    None  Visit Diagnoses         Codes    Benign essential hypertension    -  Primary I10    Relevant Medications    losartan (Cozaar) 50 mg tablet           Pito Mckeon MD

## 2024-02-13 ENCOUNTER — PROCEDURE VISIT (OUTPATIENT)
Dept: UROLOGY | Facility: CLINIC | Age: 74
End: 2024-02-13
Payer: MEDICARE

## 2024-02-13 VITALS
DIASTOLIC BLOOD PRESSURE: 88 MMHG | BODY MASS INDEX: 26.36 KG/M2 | WEIGHT: 189 LBS | SYSTOLIC BLOOD PRESSURE: 143 MMHG | TEMPERATURE: 97.6 F | HEART RATE: 86 BPM

## 2024-02-13 DIAGNOSIS — Q64.33 CONGENITAL MEATAL STENOSIS: ICD-10-CM

## 2024-02-13 DIAGNOSIS — Z01.818 PREOP TESTING: ICD-10-CM

## 2024-02-13 DIAGNOSIS — N33 BLADDER DISORDERS IN DISEASES CLASSIFIED ELSEWHERE: ICD-10-CM

## 2024-02-13 DIAGNOSIS — N40.1 BENIGN LOCALIZED PROSTATIC HYPERPLASIA WITH LOWER URINARY TRACT SYMPTOMS (LUTS): Primary | ICD-10-CM

## 2024-02-13 LAB
POC APPEARANCE, URINE: CLEAR
POC BILIRUBIN, URINE: NEGATIVE
POC BLOOD, URINE: NEGATIVE
POC COLOR, URINE: YELLOW
POC GLUCOSE, URINE: NEGATIVE MG/DL
POC KETONES, URINE: NEGATIVE MG/DL
POC LEUKOCYTES, URINE: NEGATIVE
POC NITRITE,URINE: NEGATIVE
POC PH, URINE: 6 PH
POC PROTEIN, URINE: NEGATIVE MG/DL
POC SPECIFIC GRAVITY, URINE: 1.01
POC UROBILINOGEN, URINE: 0.2 EU/DL

## 2024-02-13 PROCEDURE — 52281 CYSTOSCOPY AND TREATMENT: CPT | Performed by: UROLOGY

## 2024-02-13 PROCEDURE — 99215 OFFICE O/P EST HI 40 MIN: CPT | Performed by: UROLOGY

## 2024-02-13 PROCEDURE — 81002 URINALYSIS NONAUTO W/O SCOPE: CPT | Performed by: UROLOGY

## 2024-02-13 RX ORDER — CEFAZOLIN SODIUM 2 G/100ML
2 INJECTION, SOLUTION INTRAVENOUS ONCE
Status: CANCELLED | OUTPATIENT
Start: 2024-02-13 | End: 2024-02-13

## 2024-02-13 RX ORDER — SODIUM CHLORIDE, SODIUM LACTATE, POTASSIUM CHLORIDE, CALCIUM CHLORIDE 600; 310; 30; 20 MG/100ML; MG/100ML; MG/100ML; MG/100ML
100 INJECTION, SOLUTION INTRAVENOUS CONTINUOUS
Status: CANCELLED | OUTPATIENT
Start: 2024-02-13

## 2024-02-13 ASSESSMENT — PAIN SCALES - GENERAL: PAINLEVEL: 0-NO PAIN

## 2024-02-13 NOTE — PROGRESS NOTES
Patient is a 73 y.o., male presenting for a cystoscopy in anticipation of HoLEP.     SUBJECTIVE: HPI   TODAY (02/13/24)      TO REVIEW:  MRI showed an 80g prostate PIRADs3, biopsy deferred     Past medical, surgical, family and social history in the chart was reviewed and is accurate including any additions to what is in this HPI.    Review of Systems   Constitutional: denies any unintentional weight loss or change in strength.  Integumentary: denies any rashes or pruritus.  Eyes: denies any double vision or eye pain.  Ear/Nose/Mouth/Throat: denies any nosebleeds or gum bleeds.  Cardiovascular: denies any chest pain or syncope.  Respiratory: denies hemoptysis.  Gastrointestinal: denies nausea or vomiting.  Musculoskeletal: denies muscle cramping or weakness.  Neurologic: denies convulsions or seizures.  Hematologic/Lymphatic: denies bleeding tendencies.  Endocrine: denies heat/cold intolerance.  All other systems have been reviewed and are negative unless otherwise noted in the HPI.    OBJECTIVE:  Physical Exam   Constitutional: NAD  HEENT: AT/NC  Resp: Non labored respirations.  Skin: No jaundice or visible skin lesions.  Neuro: No focal deficits.  Psych: Appropriate mood and affect.    Labs and imaging:  Lab Results   Component Value Date    WBC 7.0 12/01/2023    HGB 15.3 12/01/2023    HCT 45.7 12/01/2023     12/01/2023    CHOL 226 (H) 12/01/2023    TRIG 115 12/01/2023    HDL 67.8 12/01/2023    ALT 29 12/01/2023    AST 28 12/01/2023     12/01/2023    K 4.8 12/01/2023     12/01/2023    CREATININE 1.00 12/01/2023    BUN 14 12/01/2023    CO2 28 12/01/2023    PSA 1.4 12/01/2023    HGBA1C 5.2 12/01/2023     PROCEDURE NOTE:    PREOPERATIVE DIAGNOSIS:  BPH    POSTOPERATIVE DIAGNOSIS:  Same    OPERATION:  Flexible Cystourethroscopy      SURGEON:  Dinorah Pineda MD    ANESTHESIA:  2%  lidocaine jelly    COMPLICATIONS:  None    EBL: Minimal    DISPOSITION:  The patient was discharged home after the  procedure, per routine.    INDICATIONS: :  Mr. Duggan is a 73 y.o. patient with a history of BPH who presents today for Cystoscopy.     The indications, risks and benefits of this procedure were discussed with the patient, consent was obtained prior to the procedure, and to the best of my judgement the patient seemed to understand and agree to the procedure.    PROCEDURE:  The patient  was brought into the procedure suite and informed consent was reviewed and confirmed. Vital signs were obtained prior to the procedure: /88   Pulse 86   Temp 36.4 °C (97.6 °F)   Wt 85.7 kg (189 lb)   BMI 26.36 kg/m² .  The patient was escorted onto the stretcher, placed supine, prepped with betadine and draped in the usual standard surgical fashion.  Intraurethral 2% viscous lidocaine jelly was used for local analgesia.  A 16 Swedish flexible cystourethroscope was inserted into the urethra.       IMPRESSION: Distended, heavily trebeculated bladder. No obstructing medial lobe. Lateral hypertrophy. Complete channel occlusion. Good sphincter co-optation. Meatal stenosis dilated to 22 caliber      ASSESSMENT:  Problem List Items Addressed This Visit       Benign localized prostatic hyperplasia with lower urinary tract symptoms (LUTS) - Primary    Relevant Orders    POCT UA (nonautomated) manually resulted (Completed)     Other Visit Diagnoses       Congenital meatal stenosis        Relevant Orders    Urethra dilation (Completed)          1.BPH with LUTs  2. meatal stenosis  3.    PLAN:  Given the large volume of the prostate, I recommended proceeding with HoLEP. I discussed that a laser will be used to shell out the obstructing tissue from the inside of the prostate gland. I discussed in detail the risks associated with the HoLEP procedure. As with any surgical procedure, HoLEP surgery has some risks. Such as, incontinence of urine which is common for a few months after surgery but is rarely permanent (about one percent of cases),  bleeding after surgery, the need for transfusion or another operation due to bleeding, UTI, damage to the bladder, damage to the ureteral orifice (a small tube where kidney drains into the bladder), prolonged need for a catheter after surgery, or scar tissue in the area of surgery or urethra. Retrograde ejaculation was discussed as a permanent irreversible side effect.     All questions were answered to the patient’s satisfaction.  Patient agrees with the plan and wishes to proceed.  Follow-up will be scheduled appropriately.     Time Spent  Prep time on day of patient encounter: 10 minutes  Time spent directly with patient, family or caregiver: 15 minutes  Additional Time Spent on Patient Care Activities: 5 minutes  Documentation Time: 10 minutes  Other Time Spent: 0 minutes  Total: 40 minutes     Scribe Attestation  By signing my name below, ILizbet Scribe   attest that this documentation has been prepared under the direction and in the presence of Dinorah Pineda MD.

## 2024-02-27 ENCOUNTER — TELEPHONE (OUTPATIENT)
Dept: PRIMARY CARE | Facility: CLINIC | Age: 74
End: 2024-02-27
Payer: MEDICARE

## 2024-02-27 DIAGNOSIS — M25.561 RIGHT KNEE PAIN, UNSPECIFIED CHRONICITY: Primary | ICD-10-CM

## 2024-02-27 NOTE — TELEPHONE ENCOUNTER
Patient is requesting a ortho referral for  his right knee pain, he say Dr castañeda wrote one and it is now

## 2024-03-07 ENCOUNTER — OFFICE VISIT (OUTPATIENT)
Dept: ORTHOPEDIC SURGERY | Facility: HOSPITAL | Age: 74
End: 2024-03-07
Payer: MEDICARE

## 2024-03-07 VITALS — WEIGHT: 184 LBS | BODY MASS INDEX: 25.76 KG/M2 | HEIGHT: 71 IN

## 2024-03-07 DIAGNOSIS — M25.561 RIGHT KNEE PAIN, UNSPECIFIED CHRONICITY: ICD-10-CM

## 2024-03-07 DIAGNOSIS — M17.11 ARTHRITIS OF RIGHT KNEE: Primary | ICD-10-CM

## 2024-03-07 DIAGNOSIS — M17.11 OSTEOARTHRITIS OF RIGHT KNEE, UNSPECIFIED OSTEOARTHRITIS TYPE: ICD-10-CM

## 2024-03-07 PROCEDURE — 1126F AMNT PAIN NOTED NONE PRSNT: CPT | Performed by: ORTHOPAEDIC SURGERY

## 2024-03-07 PROCEDURE — 1036F TOBACCO NON-USER: CPT | Performed by: ORTHOPAEDIC SURGERY

## 2024-03-07 PROCEDURE — 99213 OFFICE O/P EST LOW 20 MIN: CPT | Performed by: ORTHOPAEDIC SURGERY

## 2024-03-07 PROCEDURE — 99214 OFFICE O/P EST MOD 30 MIN: CPT | Performed by: ORTHOPAEDIC SURGERY

## 2024-03-07 PROCEDURE — 1159F MED LIST DOCD IN RCRD: CPT | Performed by: ORTHOPAEDIC SURGERY

## 2024-03-07 PROCEDURE — 1160F RVW MEDS BY RX/DR IN RCRD: CPT | Performed by: ORTHOPAEDIC SURGERY

## 2024-03-07 ASSESSMENT — PAIN SCALES - GENERAL: PAINLEVEL_OUTOF10: 1

## 2024-03-07 ASSESSMENT — PAIN - FUNCTIONAL ASSESSMENT: PAIN_FUNCTIONAL_ASSESSMENT: 0-10

## 2024-03-08 NOTE — PROGRESS NOTES
73-year-old is seen with the new problem of right knee pain.  He has been having persistent severe sharp shooting pain in the right knee since December.  He has pain with standing and walking and going up and down stairs and getting up and down from a chair in and out of a car.  He has plantar fasciitis in the right foot.  He takes Tylenol with mild relief.    Pleasant and no acute distress. Walks with antalgic gait. Stands with varus alignment of the right knee and neutral on the left.  Right knee range of motion is 5-110°. The knee is stable to varus and valgus stress Lachman and posterior drawer. There is a mild effusion. There is generalized tenderness. Left knee range of motion is 0-120°. There is no effusion. The knee is stable to varus and valgus stress Lachman and posterior drawer. Both lower extremities are well perfused the skin is intact and muscle tone is adequate.    Multiple x-ray views of the right knee are personally reviewed and there is moderate degenerative changes.    A discussion about arthritis was done.  Treatment options were reviewed.  He will perform physical therapy.  He can use Tylenol.  Cortisone and viscosupplementation were discussed as options.  He will follow-up based on his symptoms.  At some point he may become a candidate for knee replacement and this was also reviewed.

## 2024-04-01 ENCOUNTER — OFFICE VISIT (OUTPATIENT)
Dept: PRIMARY CARE | Facility: CLINIC | Age: 74
End: 2024-04-01
Payer: MEDICARE

## 2024-04-01 VITALS
WEIGHT: 194 LBS | OXYGEN SATURATION: 98 % | DIASTOLIC BLOOD PRESSURE: 75 MMHG | SYSTOLIC BLOOD PRESSURE: 121 MMHG | BODY MASS INDEX: 27.16 KG/M2 | HEIGHT: 71 IN | HEART RATE: 48 BPM

## 2024-04-01 DIAGNOSIS — R60.0 LOWER EXTREMITY EDEMA: Primary | ICD-10-CM

## 2024-04-01 DIAGNOSIS — I10 BENIGN ESSENTIAL HYPERTENSION: ICD-10-CM

## 2024-04-01 DIAGNOSIS — E78.5 DYSLIPIDEMIA: ICD-10-CM

## 2024-04-01 DIAGNOSIS — M72.2 PLANTAR FASCIITIS: ICD-10-CM

## 2024-04-01 PROCEDURE — 1160F RVW MEDS BY RX/DR IN RCRD: CPT | Performed by: INTERNAL MEDICINE

## 2024-04-01 PROCEDURE — 3078F DIAST BP <80 MM HG: CPT | Performed by: INTERNAL MEDICINE

## 2024-04-01 PROCEDURE — 99214 OFFICE O/P EST MOD 30 MIN: CPT | Performed by: INTERNAL MEDICINE

## 2024-04-01 PROCEDURE — 3074F SYST BP LT 130 MM HG: CPT | Performed by: INTERNAL MEDICINE

## 2024-04-01 PROCEDURE — 1159F MED LIST DOCD IN RCRD: CPT | Performed by: INTERNAL MEDICINE

## 2024-04-01 RX ORDER — LOSARTAN POTASSIUM 50 MG/1
50 TABLET ORAL DAILY
Qty: 90 TABLET | Refills: 3 | Status: SHIPPED | OUTPATIENT
Start: 2024-04-01 | End: 2025-03-27

## 2024-04-01 RX ORDER — ATORVASTATIN CALCIUM 20 MG/1
20 TABLET, FILM COATED ORAL DAILY
Qty: 90 TABLET | Refills: 3 | Status: SHIPPED | OUTPATIENT
Start: 2024-04-01 | End: 2025-03-27

## 2024-04-01 RX ORDER — FUROSEMIDE 40 MG/1
TABLET ORAL
Qty: 30 TABLET | Refills: 0 | Status: SHIPPED | OUTPATIENT
Start: 2024-04-01 | End: 2024-05-24 | Stop reason: ALTCHOICE

## 2024-04-01 ASSESSMENT — ENCOUNTER SYMPTOMS
ORTHOPNEA: 0
PND: 0
HYPERTENSION: 1
HEADACHES: 0
BLURRED VISION: 0
PALPITATIONS: 0
SWEATS: 0
SHORTNESS OF BREATH: 0
NECK PAIN: 0

## 2024-04-01 NOTE — PROGRESS NOTES
Raji Duggan is a 74 yo M presenting in FU.   CPE/MCR 12.1.2023.     1. Toe pain     2. R knee pain likely OA     3. R thumb OA     4. Skin lesion   -FU derm     5. LUTS   -tamsulosin 0.8   -FU uro     6. Inguinal hernia elected repair with surg ([facility])     7. DLD     8. HTN   -new start amlo-atorva 12.1.2023, c.b some LE edema very quickly   -saw Dr. Mckeon in 1.2024 and 2.2024 - switched to losartan 50 mg  and atorvastatin 20     #HM   -cscope 3.22, EGD 3.22  -tdap 2016; pneumovax/shingrix UTD   -screen labs 12.23    185 lbs in 12.23      Gen Aox3 NAD well appearing   HEENT mmm pharynx clear no cervical LAD   Eyes sclerae clear   CV rrr nl s1/2 no m/r/g  Pulm CTAB no adventitious sounds   Ext warm dry trace edema on the right and 1+ on the left       A/P       Take furosemide (Lasix) 1 tab daily for 5-7 days (until swelling seems gone) - after that you can just use as needed on days when you notice more swelling. If the swelling DOESN'T get better after a week, call or message me.     Call [phone] to schedule an echocardiogram (ultrasound of the heart).     For the plantar fasciitis: I recommend Dr. Raji Ordonez in orthopedics. You will be called to schedule or can call the referral line to schedule.   Tylenol may be easier on the stomach than the Advil: you can take two 500 mg tabs to make 1000 mg up to 3 times a day.       CL         Answers submitted by the patient for this visit:  High Blood Pressure Questionnaire (Submitted on 4/1/2024)  Chief Complaint: Hypertension  Onset: more than 1 month ago  Progression since onset: waxing and waning  Condition status: controlled  anxiety: No  blurred vision: No  chest pain: No  headaches: No  malaise/fatigue: No  neck pain: No  orthopnea: No  palpitations: No  peripheral edema: Yes  PND: No  shortness of breath: No  sweats: No  Agents associated with hypertension: NSAIDs  CAD risks: no known risk factors  Compliance problems: no compliance problems

## 2024-04-01 NOTE — PATIENT INSTRUCTIONS
Raji,     Take furosemide (Lasix) 1 tab daily for 5-7 days (until swelling seems gone) - after that you can just use as needed on days when you notice more swelling. If the swelling DOESN'T get better after a week, call or message me.     Call [phone] to schedule an echocardiogram (ultrasound of the heart).     For the plantar fasciitis: I recommend Dr. Raji Ordonez in orthopedics. You will be called to schedule or can call the referral line to schedule.   Tylenol may be easier on the stomach than the Advil: you can take two 500 mg tabs to make 1000 mg up to 3 times a day.       CL

## 2024-04-02 ENCOUNTER — EVALUATION (OUTPATIENT)
Dept: PHYSICAL THERAPY | Facility: CLINIC | Age: 74
End: 2024-04-02
Payer: MEDICARE

## 2024-04-02 DIAGNOSIS — M17.11 OSTEOARTHRITIS OF RIGHT KNEE, UNSPECIFIED OSTEOARTHRITIS TYPE: ICD-10-CM

## 2024-04-02 DIAGNOSIS — G89.29 CHRONIC PAIN OF RIGHT KNEE: Primary | ICD-10-CM

## 2024-04-02 DIAGNOSIS — M25.561 CHRONIC PAIN OF RIGHT KNEE: Primary | ICD-10-CM

## 2024-04-02 PROCEDURE — 97110 THERAPEUTIC EXERCISES: CPT | Mod: GP | Performed by: PHYSICAL THERAPIST

## 2024-04-02 PROCEDURE — 97161 PT EVAL LOW COMPLEX 20 MIN: CPT | Mod: GP | Performed by: PHYSICAL THERAPIST

## 2024-04-02 ASSESSMENT — ENCOUNTER SYMPTOMS
LOSS OF SENSATION IN FEET: 0
OCCASIONAL FEELINGS OF UNSTEADINESS: 0
DEPRESSION: 0

## 2024-04-02 NOTE — PROGRESS NOTES
"Physical Therapy    Physical Therapy Treatment    Patient Name: Raji Duggan  MRN: [id number]  Today's Date: 4/2/2024  Time Calculation  Start Time: 1000  Stop Time: 1045  Time Calculation (min): 45 min  PT Evaluation Time Entry  PT Evaluation (Low) Time Entry: 25  PT Therapeutic Procedures Time Entry  Therapeutic Exercise Time Entry: 15     Visit #1    Assessment:  Raji is a 73 y.o. male presenting with chronic R medial knee pain with worsening within the last 6-9 months without specific incident. Exam shows mildy restricted knee joint mobility in extension, restricted hip joint mobility in IR, decreased lower extremity strength and muscle performance, decreased length in hamstrings and impaired single leg balance. He has limitations in higher level activities including playing tennis and hiking. He is a good candidate for skilled PT to address these impairments and reduce pain to improve ADLs and maintain activity level.    Plan:  OP PT Plan  Treatment/Interventions: Cryotherapy, Education/ Instruction, Dry needling, Hot pack, Manual therapy, Therapeutic exercises  PT Plan: Skilled PT  PT Frequency: 1 time per week  Duration: 6-8 weeks  Onset Date: 10/01/23  Certification Period Start Date: 04/02/24  Certification Period End Date: 07/01/24  Number of Treatments Authorized: Med nec  Rehab Potential: Good  Plan of Care Agreement: Patient    Current Problem  1. Chronic pain of right knee  Follow Up In Physical Therapy      2. Osteoarthritis of right knee, unspecified osteoarthritis type  Referral to Physical Therapy    Follow Up In Physical Therapy          General  General  Reason for Referral: Right knee pain  Referred By: Dr. Brett Marcial  Preferred Learning Style: verbal, visual, written    Subjective    Raji c/o chronic R medial knee pain with worsening within the past 6-9 months which he attributes to \"upping my physical activity over the last few years.\" Pain is typically worse in the AM and with prolonged " sitting and better with movement. Playing tennis aggravates pain. He denies any swelling. He does experience occasional giving out; denies any locking or catching. He saw ortho and had x-rays  which showed moderate medial compartment OA. He has been taking Tylenol for pain. Raji also was dx with R plantar fasciitis which started in January and he feels is affecting his knee. He has been treating that with ice, stretching, orthotics and wearing a boot at night. He will be seeing a podiatrist soon. PMHx: HTN, will be undergoing prostate surgery in May, hernia repair August 2023, R rotator cuff repair 8-10 years ago     Pt Goals: “Maintain my physical activity.”    Precautions  Precautions  STEADI Fall Risk Score (The score of 4 or more indicates an increased risk of falling): 0    Pain   R medial knee 0/10 currently, 3-5/10 at worst, sharp    Prior Level of Function:  Independent in all activities including tennis, biking and hiking    Objective   Palpation: (-) TTP to R knee structures    Observation: B genu varus    Gait: non-antalgic    Single leg balance: R- 7 seconds; L- 15 seconds    Hip AROM WNL, mild restriction in R>L IR    Knee AROM (R/L in degrees): 2-133; 0-133    MMT (R/L):   Iliopsoas- 4/5; 4/5  Hip ER- 4+/5; 4+/5  Hip Abd- 4+/5; 4+/5  Glute max- 4+/5; 5/5  Quadriceps- 5/5; 5/5  Hamstrings- 4+/5; 4+/5    Length tests (R/L):  90/90 hamstrings- 66 degrees/ 70 degrees    Outcome Measures:  Lower Extremity Functional Scale: 70/80    Treatments:  Therapeutic Exercise:   SLR x10  SAQ x10  S/L hip abduction x10  Bridges x10  Instructed in seated arch lifts and belt hamstring stretch    OP EDUCATION:  Issued/reviewed HEP to be performed 1x/day    Goals:  Active       PT Problem       Increase R knee extension AROM to 0 degrees.       Start:  04/02/24    Expected End:  06/01/24            Increase length in B hamstrings by 5 degrees.       Start:  04/02/24    Expected End:  06/01/24            Increase strength  in B hip and knee MMT by at least 1/3 grade.       Start:  04/02/24    Expected End:  06/01/24            Pt will maintain R SLS for at least 15 seconds on firm surface without LOB.       Start:  04/02/24    Expected End:  06/01/24            Pt will report at least 75% decrease in R knee pain to improve ADLs.       Start:  04/02/24    Expected End:  06/01/24

## 2024-04-03 ENCOUNTER — HOSPITAL ENCOUNTER (OUTPATIENT)
Dept: CARDIOLOGY | Facility: HOSPITAL | Age: 74
Discharge: HOME | End: 2024-04-03
Payer: MEDICARE

## 2024-04-03 DIAGNOSIS — R60.0 LOWER EXTREMITY EDEMA: ICD-10-CM

## 2024-04-03 LAB
AORTIC VALVE PEAK VELOCITY: 1.41 M/S
AV PEAK GRADIENT: 8 MMHG
AVA (PEAK VEL): 3.1 CM2
EJECTION FRACTION APICAL 4 CHAMBER: 50.4
LEFT ATRIUM VOLUME AREA LENGTH INDEX BSA: 39.3 ML/M2
LEFT VENTRICLE INTERNAL DIMENSION DIASTOLE: 4.3 CM (ref 3.5–6)
LEFT VENTRICULAR OUTFLOW TRACT DIAMETER: 2.2 CM
LV EJECTION FRACTION BIPLANE: 58 %
MITRAL VALVE E/A RATIO: 0.77
MITRAL VALVE E/E' RATIO: 9.5
RIGHT VENTRICLE FREE WALL PEAK S': 17.1 CM/S
RIGHT VENTRICLE PEAK SYSTOLIC PRESSURE: 29.8 MMHG
TRICUSPID ANNULAR PLANE SYSTOLIC EXCURSION: 2.2 CM

## 2024-04-03 PROCEDURE — 93306 TTE W/DOPPLER COMPLETE: CPT | Performed by: STUDENT IN AN ORGANIZED HEALTH CARE EDUCATION/TRAINING PROGRAM

## 2024-04-03 PROCEDURE — 93306 TTE W/DOPPLER COMPLETE: CPT

## 2024-04-05 ENCOUNTER — TELEPHONE (OUTPATIENT)
Dept: PRIMARY CARE | Facility: CLINIC | Age: 74
End: 2024-04-05

## 2024-04-15 ENCOUNTER — OFFICE VISIT (OUTPATIENT)
Dept: ORTHOPEDIC SURGERY | Facility: CLINIC | Age: 74
End: 2024-04-15
Payer: MEDICARE

## 2024-04-15 ENCOUNTER — TREATMENT (OUTPATIENT)
Dept: PHYSICAL THERAPY | Facility: CLINIC | Age: 74
End: 2024-04-15
Payer: MEDICARE

## 2024-04-15 VITALS — WEIGHT: 189 LBS | BODY MASS INDEX: 26.46 KG/M2 | HEIGHT: 71 IN

## 2024-04-15 DIAGNOSIS — M17.11 OSTEOARTHRITIS OF RIGHT KNEE, UNSPECIFIED OSTEOARTHRITIS TYPE: ICD-10-CM

## 2024-04-15 DIAGNOSIS — M25.561 CHRONIC PAIN OF RIGHT KNEE: Primary | ICD-10-CM

## 2024-04-15 DIAGNOSIS — G89.29 CHRONIC PAIN OF RIGHT KNEE: Primary | ICD-10-CM

## 2024-04-15 DIAGNOSIS — M72.2 PLANTAR FASCIITIS: ICD-10-CM

## 2024-04-15 PROCEDURE — 1036F TOBACCO NON-USER: CPT | Performed by: ORTHOPAEDIC SURGERY

## 2024-04-15 PROCEDURE — 1160F RVW MEDS BY RX/DR IN RCRD: CPT | Performed by: ORTHOPAEDIC SURGERY

## 2024-04-15 PROCEDURE — 97110 THERAPEUTIC EXERCISES: CPT | Mod: GP | Performed by: PHYSICAL THERAPIST

## 2024-04-15 PROCEDURE — 1125F AMNT PAIN NOTED PAIN PRSNT: CPT | Performed by: ORTHOPAEDIC SURGERY

## 2024-04-15 PROCEDURE — 99213 OFFICE O/P EST LOW 20 MIN: CPT | Performed by: ORTHOPAEDIC SURGERY

## 2024-04-15 PROCEDURE — 1159F MED LIST DOCD IN RCRD: CPT | Performed by: ORTHOPAEDIC SURGERY

## 2024-04-15 ASSESSMENT — PAIN - FUNCTIONAL ASSESSMENT: PAIN_FUNCTIONAL_ASSESSMENT: 0-10

## 2024-04-15 ASSESSMENT — PAIN DESCRIPTION - DESCRIPTORS: DESCRIPTORS: SHARP

## 2024-04-15 ASSESSMENT — PAIN SCALES - GENERAL: PAINLEVEL_OUTOF10: 2

## 2024-04-15 NOTE — PROGRESS NOTES
"Physical Therapy    Physical Therapy Treatment    Patient Name: Raji Duggan  MRN: [id number]  Today's Date: 4/15/2024  Time Calculation  Start Time: 1150  Stop Time: 1230  Time Calculation (min): 40 min     PT Therapeutic Procedures Time Entry  Therapeutic Exercise Time Entry: 35     Visit #2    Assessment:  Min verbal cues to promote slight hip extension during S/L abd to avoid recruiting hip flexors. Shows proper form with all other home exercises.    Plan:  Pt out of town the next 3 weeks. Will resume formal PT in May.    Current Problem  1. Chronic pain of right knee  Follow Up In Physical Therapy      2. Osteoarthritis of right knee, unspecified osteoarthritis type  Follow Up In Physical Therapy          Subjective    \"My knee has been doing pretty well but I was out walking this morning and I must have been on uneven pavement and it gave out on me. It was a sharp pain that lasted no more than 10 minutes. It's better now.\" Pt going on vacation in [place] for 3 weeks.    Pain   1/10    Objective   Some rail support needed during step ups.    Treatments:  Bike x 5 mins  SLR 2# 2x10  SAQ 2# 2x10  S/L hip abduction 2# 2x10  Leg press 70# 2x10  Hamstring curls 22# 2x10  6\" step up march 2x10  Belt hamstring stretch 3x30 sec    OP EDUCATION:   Encouraged pt to continue HEP regularly while on his trip. Discussed getting ankle weights for resistance with HEP.    Goals:  Active       PT Problem       Increase R knee extension AROM to 0 degrees.       Start:  04/02/24    Expected End:  06/01/24            Increase length in B hamstrings by 5 degrees.       Start:  04/02/24    Expected End:  06/01/24            Increase strength in B hip and knee MMT by at least 1/3 grade.       Start:  04/02/24    Expected End:  06/01/24            Pt will maintain R SLS for at least 15 seconds on firm surface without LOB.       Start:  04/02/24    Expected End:  06/01/24            Pt will report at least 75% decrease in R knee pain to " improve ADLs.       Start:  04/02/24    Expected End:  06/01/24

## 2024-04-15 NOTE — PROGRESS NOTES
72-year-old male here for right heel pain.  Very active  with inferior right heel pain since January.  No specific injury.  Was playing more tennis than normal when he started develop pain in the bottom of his heel.  Stiff in the morning.  He has done some stretching exercises per his PCP.  Has never had injection.  No formal therapy.  Has been taking Advil when pain is worse.  No diabetes.  Is slowly getting better.    On exam:  WD/WN athletic male  A+O X3  NAD  No lymphedema  Inspection of both feet and ankles show symmetric arches.   Able to STR bilaterally.   Point tender inferior aspect right heel.  Adequate fat pad.  5/5 strength in all 4 planes.   Sensation intact to LT.   Good pulses.   Stable anterior drawer.  No peroneal subluxation.    (-) Dg.     Assessment: Right heel plantar fasciitis    Plan: Discussed nonoperative and operative options in detail.   Risk and benefits discussed in detail. All questions answered today.  Recovery timeline and expectations discussed in detail.  Recommend formal course of therapy to help him improve.  Discussed possible injection if pain continues.  Discussed activity limitations if we inject him.  Should improve with formal therapy given his progress.

## 2024-04-21 DIAGNOSIS — I51.89 DIASTOLIC DYSFUNCTION: Primary | ICD-10-CM

## 2024-05-06 NOTE — OP NOTE
PREOPERATIVE DIAGNOSIS:  Bilateral inguinal hernias.    POSTOPERATIVE DIAGNOSIS:  Bilateral inguinal hernias.    OPERATION/PROCEDURE:  Laparoscopic preperitoneal bilateral inguinal hernia repairs with  mesh.     SURGEON:  Melvin Eaton MD.    ASSISTANT(S):  Dr. Storm Monzon.    ANESTHESIA:    CLINICAL NOTE:  This is a patient with symptomatic bilateral inguinal hernias.  He  understands risks and benefits of surgery and consents.     OPERATIVE NOTE:  The patient was brought to the operating room.  General anesthesia  was performed.  The patient's abdomen was prepped and draped in usual  fashion.  Using a vertical umbilical incision, the anterior rectus  fascia was opened, rectus muscle retracted, the balloon dissector  placed on pubic tubercle under direct visualization.  Then, I removed  this and placed my Vivian balloon in and insufflated the  properitoneal space to a pressure of 15, placed two 5 mm trocars in  midline.  I began dissecting on his left side.  The patient had a  direct inguinal hernia on the left.  The cord structures were  peritonealized.  A 3.5 x 5.5 piece of mesh was used to cover the  entire floor of the inguinal canal.  I fixated in 3 usual spots with  AbsorbaTack.  It lied very nicely.  I turned my attention to the  right side.  He had a similar direct inguinal hernia, this was  reduced, cord structures were peritonealized.  The mesh was placed  and fixated.  The patient tolerated the procedure well.  I removed my  trocars, closed the fascial defect at the umbilicus with 0 Vicryl,  skin incisions with 4-0 Vicryl.       Melvin Eaton MD    DD:  08/11/2023 11:30:37 EST  DT:  08/11/2023 11:42:06 EST  DICTATION NUMBER:  [id number]  INTERNAL JOB NUMBER:  [id number]    CC:  MD Melvin Romo MD, Fax: [phone]        Electronic Signatures:  Melvin Eaton (MD) (Signed on 11-Aug-2023 14:04)   Authored  Unsigned, Draft (SYS GENERATED) (Entered on  11-Aug-2023 11:42)   Entered    Last Updated: 11-Aug-2023 14:04 by Melvin Eaton)

## 2024-05-09 ENCOUNTER — APPOINTMENT (OUTPATIENT)
Dept: PREADMISSION TESTING | Facility: HOSPITAL | Age: 74
End: 2024-05-09
Payer: MEDICARE

## 2024-05-12 NOTE — H&P (VIEW-ONLY)
Patient is a 73alert and oriented year old male scheduled for a  Holep on 5/23/2024 with Dr. Pineda for  Benign Localized Prostatic Hyperplasia with LUTS.    The patient denies hematuria, dysuria, burning with urination, frequency or urgency.  No fever or chills.  No lower abdominal, back or flank pain  PMHX includes Chronic Right Knee Pain, Inguinal heria.    Presents to [facility] today for perioperative risk stratification and optimization.        Review of Systems   Constitutional: Negative for chills, decreased appetite, diaphoresis, fever and malaise/fatigue.   Eyes:  Negative for blurred vision and double vision.   Cardiovascular:  Negative for chest pain, claudication, cyanosis, dyspnea on exertion, irregular heartbeat, leg swelling, near-syncope and palpitations. Known Bradycardia.  Respiratory:  Negative for cough, hemoptysis, shortness of breath and wheezing.    Tested positive for covid 4/19/2024 while in [facility]  Has no symptoms today - reports he had cold like symptoms while in [place] - runny nose and sore throat  Endocrine: Negative for cold intolerance, heat intolerance, polydipsia, polyphagia and polyuria.   Gastrointestinal:  Negative for abdominal pain, constipation, diarrhea, dysphagia, nausea and vomiting.   Genitourinary:  Negative for bladder incontinence, dysuria, hematuria, incomplete emptying, nocturia, pelvic pain and urgency.   Neurological:  Negative for headaches, light-headedness, paresthesias, sensory change and weakness.   Psychiatric/Behavioral:  Negative for altered mental status.       Vitals and nursing note reviewed.     Physical exam  Constitutional:       Appearance: Normal appearance. He is normal weight.   HENT:      Head: Normocephalic and atraumatic.      Mouth/Throat:      Mouth: Mucous membranes are moist.      Pharynx: Oropharynx is clear.   Eyes:      Extraocular Movements: Extraocular movements intact.      Conjunctiva/sclera: Conjunctivae normal.      Pupils: Pupils are  equal, round, and reactive to light.   Cardiovascular:      Rate and Rhythm: Normal rate and regular rhythm.      Pulses: Normal pulses.      Heart sounds: Normal heart sounds.      No audible carotid bruit  Pulmonary:      Effort: Pulmonary effort is normal.      Breath sounds: Normal breath sounds.   Abdominal:      General: Abdomen is flat. Bowel sounds are normal.      Palpations: Abdomen is soft.   Musculoskeletal:      Cervical back: Normal range of motion and neck supple.   Skin:     General: Skin is warm and dry.      Capillary Refill: Capillary refill takes less than 2 seconds.   Neurological:      General: No focal deficit present.      Mental Status: He is alert and oriented to person, place, and time. Mental status is at baseline.   Psychiatric:         Mood and Affect: Mood normal.         Behavior: Behavior normal.         Thought Content: Thought content normal.         Judgment: Judgment normal.     Vitals and nursing note reviewed. Physical exam within normal limits.   Vitals:    05/13/24 0957   BP: 137/67   Pulse: 50   Resp: 16   Temp: 36.5 °C (97.7 °F)   SpO2: 97%       Assessment & Plan:    Neuro:  No diagnosis or significant findings on chart review or clinical presentation and evaluation.     HEENT/Airway:  No diagnosis or significant findings on chart review or clinical presentation and evaluation.   STOP-BANG Score 4 moderate risk for MODESTA    Mallampati::  III    TM distance::  >3 FB    Neck ROM::  Full  No dentures Has dental implants and crown    Cardiovascular:  Hypertension  Managed with losartan 50mg daily  Lasix 40mg prn for edema has not used in over a month  Stable  Known bradycardia    Hyperlipidemia  Managed with atrovastatin 20mg daily    ECHO 4/3/2024   1. Left ventricular systolic function is normal with a 55-60% estimated ejection fraction.   2. Spectral Doppler shows an impaired relaxation pattern of left ventricular diastolic filling.   3. The left atrium is moderately  dilated.   4. The right atrium is mild to moderately dilated.   5. Trace to mild mitral valve regurgitation.   6. The inferior vena cava appears to be of normal size. There is IVC inspiratory collapse greater than 50%    METS: 7.01  RCRI: 0 points, 3.9%  risk for postoperative MACE   MINOR: 0.1% risk for postoperative MACE  EKG - Sinus Jl rate of 44    Pulmonary:  No diagnosis or significant findings on chart review or clinical presentation and evaluation.   Covid while in [place] tested positive 4/19/2024 no symptoms today in [place]  ARISCAT: <26 points, 1.6% risk of in-hospital postoperative pulmonary complication  PRODIGY: High risk for opioid induced respiratory depression  Smoking History-    Renal:   Benign Prostatic Hyperplasia w/ LUTS  HOLEP procedure   Managed with tamsulosin 0.4mg daily  The patient is at increased risk of perioperative renal complications secondary to age>/= 56 and male sex. Preventative measures include  CMP ordered     Endocrine:  No diagnosis or significant findings on chart review or clinical presentation and evaluation.     Hematologic:  CBC ordered   Caprini Score 4, patient at Moderate risk for postoperative DVT. Pt supplied education/VTE handout    Gastrointestinal:   GERD  Managed with Omeprazole 40mg daily  Stable   Alcohol use-2-3 glasses of wine daily  Drug use-none    Infectious disease:   No diagnosis or significant findings on chart review or clinical presentation and evaluation.       Musculoskeletal:   Chronic knee pain  Managed with es tylenol prn  JHFRAT score 5 low risk for falls    Anesthesia:  No anesthesia complications  No Family history of anesthesia complications    Labs & Imaging ordered:  CBC, BMP, PT/INR, UA per Dr. Pineda  Nickel/metal allergy-no  Shellfish allergy-no    Overall, patient at low risk for the scheduled surgery. Discussed with patient medication instructions, NPO guidelines, and any questions or concerns. Patient does not needs further workup  prior to preceding with elective surgery.        Face to face time- 30 minutes    Alice Magallon APRN, CNP

## 2024-05-12 NOTE — CPM/PAT H&P
Patient is a 73alert and oriented year old male scheduled for a  Holep on 5/23/2024 with Dr. Pineda for  Benign Localized Prostatic Hyperplasia with LUTS.    The patient denies hematuria, dysuria, burning with urination, frequency or urgency.  No fever or chills.  No lower abdominal, back or flank pain  PMHX includes Chronic Right Knee Pain, Inguinal heria.    Presents to [facility] today for perioperative risk stratification and optimization.        Review of Systems   Constitutional: Negative for chills, decreased appetite, diaphoresis, fever and malaise/fatigue.   Eyes:  Negative for blurred vision and double vision.   Cardiovascular:  Negative for chest pain, claudication, cyanosis, dyspnea on exertion, irregular heartbeat, leg swelling, near-syncope and palpitations. Known Bradycardia.  Respiratory:  Negative for cough, hemoptysis, shortness of breath and wheezing.    Tested positive for covid 4/19/2024 while in [facility]  Has no symptoms today - reports he had cold like symptoms while in [place] - runny nose and sore throat  Endocrine: Negative for cold intolerance, heat intolerance, polydipsia, polyphagia and polyuria.   Gastrointestinal:  Negative for abdominal pain, constipation, diarrhea, dysphagia, nausea and vomiting.   Genitourinary:  Negative for bladder incontinence, dysuria, hematuria, incomplete emptying, nocturia, pelvic pain and urgency.   Neurological:  Negative for headaches, light-headedness, paresthesias, sensory change and weakness.   Psychiatric/Behavioral:  Negative for altered mental status.       Vitals and nursing note reviewed.     Physical exam  Constitutional:       Appearance: Normal appearance. He is normal weight.   HENT:      Head: Normocephalic and atraumatic.      Mouth/Throat:      Mouth: Mucous membranes are moist.      Pharynx: Oropharynx is clear.   Eyes:      Extraocular Movements: Extraocular movements intact.      Conjunctiva/sclera: Conjunctivae normal.      Pupils: Pupils are  equal, round, and reactive to light.   Cardiovascular:      Rate and Rhythm: Normal rate and regular rhythm.      Pulses: Normal pulses.      Heart sounds: Normal heart sounds.      No audible carotid bruit  Pulmonary:      Effort: Pulmonary effort is normal.      Breath sounds: Normal breath sounds.   Abdominal:      General: Abdomen is flat. Bowel sounds are normal.      Palpations: Abdomen is soft.   Musculoskeletal:      Cervical back: Normal range of motion and neck supple.   Skin:     General: Skin is warm and dry.      Capillary Refill: Capillary refill takes less than 2 seconds.   Neurological:      General: No focal deficit present.      Mental Status: He is alert and oriented to person, place, and time. Mental status is at baseline.   Psychiatric:         Mood and Affect: Mood normal.         Behavior: Behavior normal.         Thought Content: Thought content normal.         Judgment: Judgment normal.     Vitals and nursing note reviewed. Physical exam within normal limits.   Vitals:    05/13/24 0957   BP: 137/67   Pulse: 50   Resp: 16   Temp: 36.5 °C (97.7 °F)   SpO2: 97%       Assessment & Plan:    Neuro:  No diagnosis or significant findings on chart review or clinical presentation and evaluation.     HEENT/Airway:  No diagnosis or significant findings on chart review or clinical presentation and evaluation.   STOP-BANG Score 4 moderate risk for MODESTA    Mallampati::  III    TM distance::  >3 FB    Neck ROM::  Full  No dentures Has dental implants and crown    Cardiovascular:  Hypertension  Managed with losartan 50mg daily  Lasix 40mg prn for edema has not used in over a month  Stable  Known bradycardia    Hyperlipidemia  Managed with atrovastatin 20mg daily    ECHO 4/3/2024   1. Left ventricular systolic function is normal with a 55-60% estimated ejection fraction.   2. Spectral Doppler shows an impaired relaxation pattern of left ventricular diastolic filling.   3. The left atrium is moderately  dilated.   4. The right atrium is mild to moderately dilated.   5. Trace to mild mitral valve regurgitation.   6. The inferior vena cava appears to be of normal size. There is IVC inspiratory collapse greater than 50%    METS: 7.01  RCRI: 0 points, 3.9%  risk for postoperative MACE   MINOR: 0.1% risk for postoperative MACE  EKG - Sinus Jl rate of 44    Pulmonary:  No diagnosis or significant findings on chart review or clinical presentation and evaluation.   Covid while in [place] tested positive 4/19/2024 no symptoms today in [place]  ARISCAT: <26 points, 1.6% risk of in-hospital postoperative pulmonary complication  PRODIGY: High risk for opioid induced respiratory depression  Smoking History-    Renal:   Benign Prostatic Hyperplasia w/ LUTS  HOLEP procedure   Managed with tamsulosin 0.4mg daily  The patient is at increased risk of perioperative renal complications secondary to age>/= 56 and male sex. Preventative measures include  CMP ordered     Endocrine:  No diagnosis or significant findings on chart review or clinical presentation and evaluation.     Hematologic:  CBC ordered   Caprini Score 4, patient at Moderate risk for postoperative DVT. Pt supplied education/VTE handout    Gastrointestinal:   GERD  Managed with Omeprazole 40mg daily  Stable   Alcohol use-2-3 glasses of wine daily  Drug use-none    Infectious disease:   No diagnosis or significant findings on chart review or clinical presentation and evaluation.       Musculoskeletal:   Chronic knee pain  Managed with es tylenol prn  JHFRAT score 5 low risk for falls    Anesthesia:  No anesthesia complications  No Family history of anesthesia complications    Labs & Imaging ordered:  CBC, BMP, PT/INR, UA per Dr. Pineda  Nickel/metal allergy-no  Shellfish allergy-no    Overall, patient at low risk for the scheduled surgery. Discussed with patient medication instructions, NPO guidelines, and any questions or concerns. Patient does not needs further workup  prior to preceding with elective surgery.        Face to face time- 30 minutes    Alice Magallon APRN, CNP

## 2024-05-13 ENCOUNTER — PRE-ADMISSION TESTING (OUTPATIENT)
Dept: PREADMISSION TESTING | Facility: HOSPITAL | Age: 74
End: 2024-05-13
Payer: MEDICARE

## 2024-05-13 ENCOUNTER — LAB (OUTPATIENT)
Dept: LAB | Facility: LAB | Age: 74
End: 2024-05-13
Payer: MEDICARE

## 2024-05-13 VITALS
OXYGEN SATURATION: 97 % | HEIGHT: 71 IN | DIASTOLIC BLOOD PRESSURE: 67 MMHG | WEIGHT: 189.04 LBS | TEMPERATURE: 97.7 F | HEART RATE: 50 BPM | BODY MASS INDEX: 26.47 KG/M2 | SYSTOLIC BLOOD PRESSURE: 137 MMHG | RESPIRATION RATE: 16 BRPM

## 2024-05-13 DIAGNOSIS — N33 BLADDER DISORDERS IN DISEASES CLASSIFIED ELSEWHERE: ICD-10-CM

## 2024-05-13 DIAGNOSIS — Z01.818 PREOPERATIVE TESTING: Primary | ICD-10-CM

## 2024-05-13 DIAGNOSIS — N40.1 BENIGN LOCALIZED PROSTATIC HYPERPLASIA WITH LOWER URINARY TRACT SYMPTOMS (LUTS): ICD-10-CM

## 2024-05-13 DIAGNOSIS — Z01.818 PREOP TESTING: ICD-10-CM

## 2024-05-13 LAB
ANION GAP SERPL CALC-SCNC: 12 MMOL/L (ref 10–20)
BUN SERPL-MCNC: 15 MG/DL (ref 6–23)
CALCIUM SERPL-MCNC: 8.9 MG/DL (ref 8.6–10.3)
CHLORIDE SERPL-SCNC: 108 MMOL/L (ref 98–107)
CO2 SERPL-SCNC: 27 MMOL/L (ref 21–32)
CREAT SERPL-MCNC: 0.94 MG/DL (ref 0.5–1.3)
EGFRCR SERPLBLD CKD-EPI 2021: 86 ML/MIN/1.73M*2
ERYTHROCYTE [DISTWIDTH] IN BLOOD BY AUTOMATED COUNT: 12.8 % (ref 11.5–14.5)
GLUCOSE SERPL-MCNC: 102 MG/DL (ref 74–99)
HCT VFR BLD AUTO: 39.7 % (ref 41–52)
HGB BLD-MCNC: 13.5 G/DL (ref 13.5–17.5)
INR PPP: 1.1 (ref 0.9–1.2)
MCH RBC QN AUTO: 30.4 PG (ref 26–34)
MCHC RBC AUTO-ENTMCNC: 34 G/DL (ref 32–36)
MCV RBC AUTO: 89 FL (ref 80–100)
NRBC BLD-RTO: ABNORMAL /100{WBCS}
PLATELET # BLD AUTO: 206 X10*3/UL (ref 150–450)
POTASSIUM SERPL-SCNC: 4.6 MMOL/L (ref 3.5–5.3)
PROTHROMBIN TIME: 10.4 SECONDS (ref 9.3–12.7)
RBC # BLD AUTO: 4.44 X10*6/UL (ref 4.5–5.9)
SODIUM SERPL-SCNC: 142 MMOL/L (ref 136–145)
WBC # BLD AUTO: 7.5 X10*3/UL (ref 4.4–11.3)

## 2024-05-13 PROCEDURE — 85027 COMPLETE CBC AUTOMATED: CPT

## 2024-05-13 PROCEDURE — 80048 BASIC METABOLIC PNL TOTAL CA: CPT

## 2024-05-13 PROCEDURE — 85610 PROTHROMBIN TIME: CPT

## 2024-05-13 PROCEDURE — 93005 ELECTROCARDIOGRAM TRACING: CPT

## 2024-05-13 PROCEDURE — 87086 URINE CULTURE/COLONY COUNT: CPT

## 2024-05-13 PROCEDURE — 36415 COLL VENOUS BLD VENIPUNCTURE: CPT

## 2024-05-13 RX ORDER — GLUCOSAMINE/CHONDRO SU A 500-400 MG
1 TABLET ORAL 2 TIMES DAILY
COMMUNITY

## 2024-05-13 RX ORDER — BISMUTH SUBSALICYLATE 262 MG
1 TABLET,CHEWABLE ORAL DAILY
COMMUNITY

## 2024-05-13 ASSESSMENT — PAIN SCALES - GENERAL: PAINLEVEL_OUTOF10: 0 - NO PAIN

## 2024-05-13 ASSESSMENT — PAIN - FUNCTIONAL ASSESSMENT: PAIN_FUNCTIONAL_ASSESSMENT: 0-10

## 2024-05-13 NOTE — PREPROCEDURE INSTRUCTIONS
Medication List            Accurate as of May 13, 2024 10:04 AM. Always use your most recent med list.                atorvastatin 20 mg tablet  Commonly known as: Lipitor  Take 1 tablet (20 mg) by mouth once daily.     furosemide 40 mg tablet  Commonly known as: Lasix  Take 1 tab PO qam for 1 week then continue to use as needed for swelling only  Notes to patient: Take as needed.  Hold morning of surgery     glucosamine-chondroitin 500-400 mg tablet     losartan 50 mg tablet  Commonly known as: Cozaar  Take 1 tablet (50 mg) by mouth once daily.  Medication Adjustments for Surgery: Stop 1 day before surgery  Notes to patient: Last dose 5/22/2024     multivitamin tablet     omeprazole 40 mg DR capsule  Commonly known as: PriLOSEC  Take 1 capsule (40 mg) by mouth once daily in the morning.  Medication Adjustments for Surgery: Take morning of surgery with sip of water, no other fluids     tamsulosin 0.4 mg 24 hr capsule  Commonly known as: Flomax  Take 2 capsules (0.8 mg) by mouth once daily.     Tylenol Extra Strength 500 mg tablet  Generic drug: acetaminophen              NPO Instructions:    Do not eat any food after midnight the night before your surgery/procedure.  You may have clear liquids until TWO hours before surgery/procedure. This includes water, black tea/coffee, (no milk or cream) apple juice and electrolyte drinks (Gatorade).  You may chew gum up to TWO hours before your surgery/procedure.    Additional Instructions:     Seven/Six Days before Surgery:  Review your medication instructions, stop indicated medications  Five Days before Surgery:  Review your medication instructions, stop indicated medications  Three Days before Surgery:  Review your medication instructions, stop indicated medications  The Day before Surgery:  Review your medication instructions, stop indicated medications  You will be contacted regarding the time of your arrival to facility and surgery time  Do not eat any food after  Midnight  Day of Surgery:  Review your medication instructions, take indicated medications  You may have clear liquids until TWO hours before surgery/procedure.  This includes water, black tea/coffee, (no milk or cream) apple juice and electrolyte drinks (Gatorade)  You may chew gum up to TWO hours before your surgery/procedure  Wear  comfortable loose fitting clothing  Do not use moisturizers, creams, lotions or perfume  All jewelry and valuables should be left at home  CONTACT SURGEON'S OFFICE IF YOU DEVELOP:  * Fever = 100.4 F   * New respiratory symptoms (e.g. cough, shortness of breath, respiratory distress, sore throat)  * Recent loss of taste or smell  *Flu like symptoms such as headache, fatigue or gastrointestinal symptoms  * You develop any open sores, shingles, burning or painful urination   AND/OR:  * You no longer wish to have the surgery.  * Any other personal circumstances change that may lead to the need to cancel or defer this surgery.  *You were admitted to any hospital within one week of your planned procedure.    SMOKING:  *Quitting smoking can make a huge difference to your health and recovery from surgery.    *If you need help with quitting, call 6-246-QUIT-NOW.    THE DAY BEFORE SURGERY:  *Do not eat any food after midnight the night before your surgery.   *You may have up to 13.5 OUNCES of clear liquids until TWO hours before your instructed ARRIVAL TIME to hospital. This includes water, black tea/coffee, (no milk or cream) apple juice, clear broth and electrolyte drinks (Gatorade). Please avoid clear liquids that are red in color.   *You may chew gum/mints up to TWO hours before your surgery/procedure.    SURGICAL TIME:  *You will be contacted between 2 p.m. and 3 p.m. the business day before your surgery with your arrival time.  *If you haven't received a call by 3pm, call [phone]  *Scheduled surgery times may change and you will be notified if this occurs-check your personal  voicemail for any updates.    ON THE MORNING OF SURGERY:  *Wear comfortable, loose fitting clothing.   *Do not use moisturizers, creams, lotions or perfume.  *All jewelry and valuables should be left at home.  *Prosthetic devices such as contact lenses, hearing aids, dentures, eyelash extensions, hairpins and body piercing must be removed before surgery.    BRING WITH YOU:  *Photo ID and insurance card  *Current list of medications and allergies  *Pacemaker/Defibrillator/Heart stent cards  *CPAP machine and mask  *Slings/splints/crutches  *Copy of your complete Advanced Directive/DHPOA-if applicable  *Neurostimulator implant remote    PARKING AND ARRIVAL:  *Check in at the Main Entrance desk and let them know you are here for surgery.    IF YOU ARE HAVING OUTPATIENT/SAME DAY SURGERY:  *A responsible adult MUST accompany you at the time of discharge and stay with you for 24 hours after your surgery.  *You may NOT drive yourself home after surgery.  *You may use a taxi or ride sharing service ([employer], [employer]) to return home ONLY if you are accompanied by a friend or family member.  *Instructions for resuming your medications will be provided by your surgeon.    Thank you for coming to Pre Admission testing.     If I have prescribe medication please don't forget to  at your pharmacy.     Any questions about today's visit call [phone] and leave a message in the general mailbox.    Patient instructed to ambulate as soon as possible postoperatively to decrease thromboembolic risk.    Alice Magallon, APRN-CNP    Thank you for visiting [facility].  If you have any changes to your health condition, please call the surgeons office to alert them and give them details of your symptoms.        Preoperative Fasting Guidelines    Why must I stop eating and drinking near surgery time?  With sedation, food or liquid in your stomach can enter your lungs causing serious complications  Increases nausea  and vomiting    When do I need to stop eating and drinking before my surgery?  Do not eat any food after midnight the night before your surgery/procedure.  You may have up to 13.5 ounces of clear liquid until TWO hours before your instructed arrival time to the hospital.  This includes water, black tea/coffee, (no milk or cream) apple juice, and electrolyte drinks (Gatorade)  You may chew gum until TWO hours before your surgery/procedure      Additional Instructions:     The Day before Surgery:  -Review your medication instructions, stop indicated medications  -You will be contacted in the evening regarding the time of your arrival to facility and surgery time    Day of Surgery:  -Review your medication instructions, take indicated medications  -Wear comfortable loose fitting clothing  -Do not use moisturizers, creams, lotions or perfume  -All jewelry and valuables should be left at home              Preoperative Brain Exercises    What are brain exercises?  A brain exercise is any activity that engages your thinking (cognitive) skills.    What types of activities are considered brain exercises?  Jigsaw puzzles, crossword puzzles, word jumble, memory games, word search, and many more.  Many can be found free online or on your phone via a mobile danae.    Why should I do brain exercises before my surgery?  More recent research has shown brain exercise before surgery can lower the risk of postoperative delirium (confusion) which can be especially important for older adults.  Patients who did brain exercises for 5 to 10 hours the days before surgery, cut their risk of postoperative delirium in half up to 1 week after surgery.                  [facility]    Preoperative Deep Breathing Exercises    Why it is important to do deep breathing exercises before my surgery?  Deep breathing exercises strengthen your breathing muscles.  This helps you to recover after your surgery and decreases the chance  of breathing complications.      How are the deep breathing exercises done?  Sit straight with your back supported.  Breathe in deeply and slowly through your nose. Your lower rib cage should expand and your abdomen may move forward.  Hold that breath for 3 to 5 seconds.  Breathe out through pursed lips, slowly and completely.  Rest and repeat 10 times every hour while awake.  Rest longer if you become dizzy or lightheaded.                [facility]    Preoperative Deep Breathing Exercises    Why it is important to do deep breathing exercises before my surgery?  Deep breathing exercises strengthen your breathing muscles.  This helps you to recover after your surgery and decreases the chance of breathing complications.      How are the deep breathing exercises done?  Sit straight with your back supported.  Breathe in deeply and slowly through your nose. Your lower rib cage should expand and your abdomen may move forward.  Hold that breath for 3 to 5 seconds.  Breathe out through pursed lips, slowly and completely.  Rest and repeat 10 times every hour while awake.  Rest longer if you become dizzy or lightheaded.      Patient Information: Incentive Spirometer  What is an incentive spirometer?  An incentive spirometer is a device used before and after surgery to “exercise” your lungs.  It helps you to take deeper breaths to expand your lungs.  Below is an example of a basic incentive spirometer.  The device you receive may differ slightly but they all function the same.    Why do I need to use an incentive spirometer?  Using your incentive spirometer prepares your lungs for surgery and helps prevent lung problems after surgery.  How do I use my incentive spirometer?  When you're using your incentive spirometer, make sure to breathe through your mouth. If you breathe through your nose, the incentive spirometer won't work properly. You can hold your nose if you have trouble.  If you feel dizzy  at any time, stop and rest. Try again at a later time.  Follow the steps below:  Set up your incentive spirometer, expand the flexible tubing and connect to the outlet.  Sit upright in a chair or bed. Hold the incentive spirometer at eye level.   Put the mouthpiece in your mouth and close your lips tightly around it. Slowly breathe out (exhale) completely.  Breathe in (inhale) slowly through your mouth as deeply as you can. As you take a breath, you will see the piston rise inside the large column. While the piston rises, the indicator should move upwards. It should stay in between the 2 arrows (see Figure).  Try to get the piston as high as you can, while keeping the indicator between the arrows.   If the indicator doesn't stay between the arrows, you're breathing either too fast or too slow.  When you get it as high as you can, hold your breath for 10 seconds, or as long as possible. While you're holding your breath, the piston will slowly fall to the base of the spirometer.  Once the piston reaches the bottom of the spirometer, breathe out slowly through your mouth. Rest for a few seconds.  Repeat 10 times. Try to get the piston to the same level with each breath.  Repeat every hour while awake  You can carefully clean the outside of the mouthpiece with an alcohol wipe or soap and water.      Patient and Family Education             Ways You Can Help Prevent Blood Clots             This handout explains some simple things you can do to help prevent blood clots.      Blood clots are blockages that can form in the body's veins. When a blood clot forms in your deep veins, it may be called a deep vein thrombosis, or DVT for short. Blood clots can happen in any part of the body where blood flows, but they are most common in the arms and legs. If a piece of a blood clot breaks free and travels to the lungs, it is called a pulmonary embolus (PE). A PE can be a very serious problem.         Being in the hospital or  having surgery can raise your chances of getting a blood clot because you may not be well enough to move around as much as you normally do.         Ways you can help prevent blood clots in the hospital         Wearing SCDs. SCDs stands for Sequential Compression Devices.   SCDs are special sleeves that wrap around your legs  They attach to a pump that fills them with air to gently squeeze your legs every few minutes.   This helps return the blood in your legs to your heart.   SCDs should only be taken off when walking or bathing.   SCDs may not be comfortable, but they can help save your life.               Wearing compression stockings - if your doctor orders them. These special snug fitting stockings gently squeeze your legs to help blood flow.       Walking. Walking helps move the blood in your legs.   If your doctor says it is ok, try walking the halls at least   5 times a day. Ask us to help you get up, so you don't fall.      Taking any blood thinning medicines your doctor orders.        Page 1 of 2     [facility]; 3/23   Ways you can help prevent blood clots at home       Wearing compression stockings - if your doctor orders them. ? Walking - to help move the blood in your legs.       Taking any blood thinning medicines your doctor orders.      Signs of a blood clot or PE      Tell your doctor or nurse know right away if you have of the problems listed below.    If you are at home, seek medical care right away. Call 911 for chest pain or problems breathing.               Signs of a blood clot (DVT) - such as pain,  swelling, redness or warmth in your arm or leg      Signs of a pulmonary embolism (PE) - such as chest     pain or feeling short of breath

## 2024-05-14 ENCOUNTER — EVALUATION (OUTPATIENT)
Dept: PHYSICAL THERAPY | Facility: CLINIC | Age: 74
End: 2024-05-14
Payer: MEDICARE

## 2024-05-14 DIAGNOSIS — M72.2 PLANTAR FASCIITIS: ICD-10-CM

## 2024-05-14 DIAGNOSIS — M79.671 RIGHT FOOT PAIN: Primary | ICD-10-CM

## 2024-05-14 LAB
ATRIAL RATE: 44 BPM
BACTERIA UR CULT: NO GROWTH
P AXIS: 18 DEGREES
P OFFSET: 193 MS
P ONSET: 141 MS
PR INTERVAL: 148 MS
Q ONSET: 215 MS
QRS COUNT: 7 BEATS
QRS DURATION: 98 MS
QT INTERVAL: 472 MS
QTC CALCULATION(BAZETT): 403 MS
QTC FREDERICIA: 425 MS
R AXIS: 11 DEGREES
T AXIS: 7 DEGREES
T OFFSET: 451 MS
VENTRICULAR RATE: 44 BPM

## 2024-05-14 PROCEDURE — 97110 THERAPEUTIC EXERCISES: CPT | Mod: GP | Performed by: PHYSICAL THERAPIST

## 2024-05-14 PROCEDURE — 97161 PT EVAL LOW COMPLEX 20 MIN: CPT | Mod: GP | Performed by: PHYSICAL THERAPIST

## 2024-05-14 ASSESSMENT — ENCOUNTER SYMPTOMS
LOSS OF SENSATION IN FEET: 0
DEPRESSION: 0
OCCASIONAL FEELINGS OF UNSTEADINESS: 0

## 2024-05-15 NOTE — PROGRESS NOTES
Physical Therapy    Physical Therapy Evaluation and Treatment      Patient Name: Raji Duggan  MRN: [id number]  Today's Date: 5/15/2024  Start Time: 1115  Stop Time: 1200  Total Time: 45 mins  PT Evaluation Time Entry  PT Evaluation (Low) Time Entry: 25  PT Therapeutic Procedures Time Entry  Therapeutic Exercise Time Entry: 10     Insurance: Medicare  Certification: 5/14/24 - 8/12/24  PT visit limit: Medical necessity  Visit #1    Assessment:  Raji is a 73 y.o. male presenting with R heel/foot pain ongoing since February without CARLI. Exam shows B pes cavus, TTP to plantar fascia origin at calcaneal tubercle and flexor hallucis longus, decreased strength and performance in ankle musculature and foot intrinsics and decreased length in gastrocs. He is limited in prolonged walking and playing tennis. Skilled PT is medically necessary to address these impairments and reduce pain to improve ADLs.    Plan:  OP PT Plan  Treatment/Interventions: Cryotherapy, Dry needling, Education/ Instruction, Hot pack, Manual therapy, Orthosis fit/ training, Self care/ home management, Therapeutic exercises, Taping techniques  PT Plan: Skilled PT  PT Frequency: 1 time per week  Duration: 6 weeks  Onset Date: 02/01/24  Certification Period Start Date: 05/14/24  Certification Period End Date: 08/12/24  Number of Treatments Authorized: Medical necessity  Rehab Potential: Excellent  Plan of Care Agreement: Patient    Current Problem:   1. Right foot pain  Follow Up In Physical Therapy      2. Plantar fasciitis  Referral to Physical Therapy    Follow Up In Physical Therapy        General:  Reason for Referral: Right plantar fasciitis  Referred By: Dr. Raji Ordonez  Preferred Learning Style: verbal, visual, written    Subjective    Raji c/o R heel pain that began sometime in February without CARLI. He attributes onset to overuse from playing a lot of tennis. He describes sharp plantar heel pain worst in the morning or after he's been sitting for a  "while. \"Any step aggravates it but the pain goes down once I start moving.” He has been taking Tylenol, icing and stretching. He has also been wearing custom inserts which he has had for 20 years. He is limited in how much he walks and plays tennis. He denies any n/t in foot. He does have h/o similar symptoms on L foot 15-20 years ago. PMH: L bunionectomy, L hammer toe surgery 15-18 years ago    Pt Goals: “Pain relief”    Precautions:  Precautions  STEADI Fall Risk Score (The score of 4 or more indicates an increased risk of falling): 0    Pain:  R plantar heel/foot 1-2/10 currently; 7/10 at worst; sharp in nature    Prior Level of Function:  Independent in all activities including playing tennis.    Objective   Palpation: TTP to R medial calcaneal tubercle, flexor hallucis longus    Observation:  B pes cavus    Gait: non-antalgic    Single leg balance: 30 seconds bilat    Single calf raise (R/L): 3.25” / 3”     Ankle AROM symmetrical, WNL and pain-free    Ankle/Foot MMT (R/L):   Tibialis anterior- 5/5; 5/5  Tibialis posterior- 4+/5; 4+/5  Peroneals- 5/5; 5/5  Soleus- 4+/5; 4+/5  Gastrocs- 5/5; 5/5  EHL- 5/5; 5/5  FHL- 4/5; 4/5    Length tests (R/L in degrees):   Gastrocs- 5 / 5    Outcome Measures:  Lower Extremity Functional Scale: 66/80    Treatments:  Dry Needling:  Pt informed of risks and consent obtained.  Pt prone. Skin and surrounding area cleaned and prepped with isopropyl alcohol wipe. 1- 1\" needle inserted into R heel at medial calcaneal tubercle. Total treatment time was 5 mins.    Therapeutic Exercise:   Seated towel calf stretch 3x30 sec  Seated arch lifts x10  Seated green band tib posterior strengthening x20    EDUCATION:  Issued/reviewed HEP to be performed 2x/day.  Discussed proper use of frozen water bottle for arch massage.  Discussed potential benefit of being fitted for new orthotics.    Goals:  Active       PT Problem       Increase B tibialis posterior and soleus MMT to 5/5 and B FHL to at " least 4+/5 to improve dynamic medial arch support with weight-bearing activities.       Start:  05/15/24    Expected End:  07/13/24            Increase B gastroc length to 8-10 degrees to reduce posterior forces on the ankle and foot contributing to stress on plantar fascia.       Start:  05/15/24    Expected End:  07/13/24            Pt will report at least 75% decrease in R heel/foot pain to improve walking and playing tennis.       Start:  05/15/24    Expected End:  07/13/24            Pt will demonstrate independence with HEP for proper self-management at home.       Start:  05/15/24    Expected End:  07/13/24

## 2024-05-16 DIAGNOSIS — M72.2 PLANTAR FASCIITIS: ICD-10-CM

## 2024-05-21 ENCOUNTER — TREATMENT (OUTPATIENT)
Dept: PHYSICAL THERAPY | Facility: CLINIC | Age: 74
End: 2024-05-21
Payer: MEDICARE

## 2024-05-21 ENCOUNTER — APPOINTMENT (OUTPATIENT)
Dept: PHYSICAL THERAPY | Facility: CLINIC | Age: 74
End: 2024-05-21
Payer: MEDICARE

## 2024-05-21 DIAGNOSIS — M72.2 PLANTAR FASCIITIS: ICD-10-CM

## 2024-05-21 DIAGNOSIS — M79.671 RIGHT FOOT PAIN: Primary | ICD-10-CM

## 2024-05-21 PROCEDURE — 97110 THERAPEUTIC EXERCISES: CPT | Mod: GP | Performed by: PHYSICAL THERAPIST

## 2024-05-21 NOTE — PROGRESS NOTES
"Physical Therapy    Physical Therapy Treatment    Patient Name: Raji Duggan  MRN: [id number]  Today's Date: 5/21/2024  Time Calculation  Start Time: 1035  Stop Time: 1115  Time Calculation (min): 40 min  PT Therapeutic Procedures Time Entry  Therapeutic Exercise Time Entry: 30  Insurance: Medicare  Certification: 5/14/24 - 8/12/24  PT visit limit: Medical necessity  Visit #1    Assessment:  Able to progress resistance with tib posterior strengthening maintaining proper recruitment of tib posterior. Pt has been performing arch lifts in standing, cued to perform in seated.    Plan:  STM to calf and plantar fascia.    Current Problem  1. Right foot pain  Follow Up In Physical Therapy      2. Plantar fasciitis  Follow Up In Physical Therapy          Subjective    \"I think there was some improvement the day of last session but I woke up the next day and felt the same. I played tennis yesterday and my knee did pretty well but I paid for it with my foot.\" Reports compliance to HEP.    Pain  R knee 2/10, R foot 4/10    Objective   Proper recruitment of foot intrinsics during arch lifts.    Treatments:  Dry Needling:  Pt informed of risks and consent obtained.  Pt prone. Skin and surrounding area cleaned and prepped with isopropyl alcohol wipe. 1- 1\" needle inserted into R heel at medial calcaneal tubercle. Total treatment time was 5 mins.     Therapeutic Exercise:   Seated towel calf stretch 3x30 sec R  Seated black band tib posterior strengthening 2x20   SLR 2# 2x10  SAQ 2# 2x10  S/L hip abduction 2# 2x10  Seated arch lifts x10  Standing gastroc stretch 3x30 sec R  Standing soleus stretch 3x30 sec R    OP EDUCATION:  Added standing gastroc and soleus stretch to HEP    Goals:  R knee pain Problems       R knee pain Problems (Active)       PT Problem       Increase R knee extension AROM to 0 degrees.       Start:  04/02/24    Expected End:  06/01/24            Increase length in B hamstrings by 5 degrees.       Start:  " 04/02/24    Expected End:  06/01/24            Increase strength in B hip and knee MMT by at least 1/3 grade.       Start:  04/02/24    Expected End:  06/01/24            Pt will maintain R SLS for at least 15 seconds on firm surface without LOB.       Start:  04/02/24    Expected End:  06/01/24            Pt will report at least 75% decrease in R knee pain to improve ADLs.       Start:  04/02/24    Expected End:  06/01/24                 R heel pain Problems       R heel pain Problems (Active)       PT Problem       Increase B tibialis posterior and soleus MMT to 5/5 and B FHL to at least 4+/5 to improve dynamic medial arch support with weight-bearing activities.       Start:  05/15/24    Expected End:  07/13/24            Increase B gastroc length to 8-10 degrees to reduce posterior forces on the ankle and foot contributing to stress on plantar fascia.       Start:  05/15/24    Expected End:  07/13/24            Pt will report at least 75% decrease in R heel/foot pain to improve walking and playing tennis.       Start:  05/15/24    Expected End:  07/13/24            Pt will demonstrate independence with HEP for proper self-management at home.       Start:  05/15/24    Expected End:  07/13/24

## 2024-05-23 ENCOUNTER — ANESTHESIA EVENT (OUTPATIENT)
Dept: OPERATING ROOM | Facility: HOSPITAL | Age: 74
End: 2024-05-23
Payer: MEDICARE

## 2024-05-23 ENCOUNTER — ANESTHESIA (OUTPATIENT)
Dept: OPERATING ROOM | Facility: HOSPITAL | Age: 74
End: 2024-05-23
Payer: MEDICARE

## 2024-05-23 ENCOUNTER — HOSPITAL ENCOUNTER (OUTPATIENT)
Facility: HOSPITAL | Age: 74
Setting detail: OUTPATIENT SURGERY
Discharge: HOME | End: 2024-05-23
Attending: UROLOGY | Admitting: UROLOGY
Payer: MEDICARE

## 2024-05-23 VITALS
TEMPERATURE: 97.5 F | OXYGEN SATURATION: 94 % | BODY MASS INDEX: 25.49 KG/M2 | SYSTOLIC BLOOD PRESSURE: 117 MMHG | HEIGHT: 71 IN | DIASTOLIC BLOOD PRESSURE: 56 MMHG | HEART RATE: 52 BPM | WEIGHT: 182.1 LBS | RESPIRATION RATE: 16 BRPM

## 2024-05-23 DIAGNOSIS — N40.1 BENIGN LOCALIZED PROSTATIC HYPERPLASIA WITH LOWER URINARY TRACT SYMPTOMS (LUTS): ICD-10-CM

## 2024-05-23 PROCEDURE — 3700000002 HC GENERAL ANESTHESIA TIME - EACH INCREMENTAL 1 MINUTE: Performed by: UROLOGY

## 2024-05-23 PROCEDURE — 2500000005 HC RX 250 GENERAL PHARMACY W/O HCPCS: Performed by: UROLOGY

## 2024-05-23 PROCEDURE — 2500000004 HC RX 250 GENERAL PHARMACY W/ HCPCS (ALT 636 FOR OP/ED): Performed by: UROLOGY

## 2024-05-23 PROCEDURE — A52649 PR LASER ENUCLEATION PROSTATE W MORCELLATION: Performed by: STUDENT IN AN ORGANIZED HEALTH CARE EDUCATION/TRAINING PROGRAM

## 2024-05-23 PROCEDURE — A52649 PR LASER ENUCLEATION PROSTATE W MORCELLATION: Performed by: ANESTHESIOLOGIST ASSISTANT

## 2024-05-23 PROCEDURE — 7100000009 HC PHASE TWO TIME - INITIAL BASE CHARGE: Performed by: UROLOGY

## 2024-05-23 PROCEDURE — 2500000004 HC RX 250 GENERAL PHARMACY W/ HCPCS (ALT 636 FOR OP/ED): Performed by: ANESTHESIOLOGIST ASSISTANT

## 2024-05-23 PROCEDURE — 2500000004 HC RX 250 GENERAL PHARMACY W/ HCPCS (ALT 636 FOR OP/ED): Performed by: STUDENT IN AN ORGANIZED HEALTH CARE EDUCATION/TRAINING PROGRAM

## 2024-05-23 PROCEDURE — 3600000004 HC OR TIME - INITIAL BASE CHARGE - PROCEDURE LEVEL FOUR: Performed by: UROLOGY

## 2024-05-23 PROCEDURE — 99100 ANES PT EXTEME AGE<1 YR&>70: CPT | Performed by: STUDENT IN AN ORGANIZED HEALTH CARE EDUCATION/TRAINING PROGRAM

## 2024-05-23 PROCEDURE — C1889 IMPLANT/INSERT DEVICE, NOC: HCPCS | Performed by: UROLOGY

## 2024-05-23 PROCEDURE — 3600000009 HC OR TIME - EACH INCREMENTAL 1 MINUTE - PROCEDURE LEVEL FOUR: Performed by: UROLOGY

## 2024-05-23 PROCEDURE — 2500000004 HC RX 250 GENERAL PHARMACY W/ HCPCS (ALT 636 FOR OP/ED): Mod: JZ | Performed by: UROLOGY

## 2024-05-23 PROCEDURE — 88307 TISSUE EXAM BY PATHOLOGIST: CPT | Mod: TC,BEALAB,WESLAB | Performed by: UROLOGY

## 2024-05-23 PROCEDURE — 2720000007 HC OR 272 NO HCPCS: Performed by: UROLOGY

## 2024-05-23 PROCEDURE — 7100000010 HC PHASE TWO TIME - EACH INCREMENTAL 1 MINUTE: Performed by: UROLOGY

## 2024-05-23 PROCEDURE — 52649 PROSTATE LASER ENUCLEATION: CPT | Performed by: UROLOGY

## 2024-05-23 PROCEDURE — 7100000002 HC RECOVERY ROOM TIME - EACH INCREMENTAL 1 MINUTE: Performed by: UROLOGY

## 2024-05-23 PROCEDURE — 7100000001 HC RECOVERY ROOM TIME - INITIAL BASE CHARGE: Performed by: UROLOGY

## 2024-05-23 PROCEDURE — C1758 CATHETER, URETERAL: HCPCS | Performed by: UROLOGY

## 2024-05-23 PROCEDURE — C1782 MORCELLATOR: HCPCS | Performed by: UROLOGY

## 2024-05-23 PROCEDURE — C1769 GUIDE WIRE: HCPCS | Performed by: UROLOGY

## 2024-05-23 PROCEDURE — 3700000001 HC GENERAL ANESTHESIA TIME - INITIAL BASE CHARGE: Performed by: UROLOGY

## 2024-05-23 PROCEDURE — 2500000005 HC RX 250 GENERAL PHARMACY W/O HCPCS: Performed by: ANESTHESIOLOGIST ASSISTANT

## 2024-05-23 RX ORDER — ONDANSETRON HYDROCHLORIDE 2 MG/ML
INJECTION, SOLUTION INTRAVENOUS AS NEEDED
Status: DISCONTINUED | OUTPATIENT
Start: 2024-05-23 | End: 2024-05-23

## 2024-05-23 RX ORDER — DIPHENHYDRAMINE HYDROCHLORIDE 50 MG/ML
12.5 INJECTION INTRAMUSCULAR; INTRAVENOUS ONCE AS NEEDED
Status: DISCONTINUED | OUTPATIENT
Start: 2024-05-23 | End: 2024-05-23 | Stop reason: HOSPADM

## 2024-05-23 RX ORDER — ALBUTEROL SULFATE 0.83 MG/ML
2.5 SOLUTION RESPIRATORY (INHALATION) ONCE AS NEEDED
Status: DISCONTINUED | OUTPATIENT
Start: 2024-05-23 | End: 2024-05-23 | Stop reason: HOSPADM

## 2024-05-23 RX ORDER — FENTANYL CITRATE 50 UG/ML
25 INJECTION, SOLUTION INTRAMUSCULAR; INTRAVENOUS EVERY 5 MIN PRN
Status: DISCONTINUED | OUTPATIENT
Start: 2024-05-23 | End: 2024-05-23 | Stop reason: HOSPADM

## 2024-05-23 RX ORDER — MIDAZOLAM HYDROCHLORIDE 1 MG/ML
INJECTION, SOLUTION INTRAMUSCULAR; INTRAVENOUS AS NEEDED
Status: DISCONTINUED | OUTPATIENT
Start: 2024-05-23 | End: 2024-05-23

## 2024-05-23 RX ORDER — FENTANYL CITRATE 50 UG/ML
INJECTION, SOLUTION INTRAMUSCULAR; INTRAVENOUS AS NEEDED
Status: DISCONTINUED | OUTPATIENT
Start: 2024-05-23 | End: 2024-05-23

## 2024-05-23 RX ORDER — LIDOCAINE HYDROCHLORIDE 20 MG/ML
JELLY TOPICAL AS NEEDED
Status: DISCONTINUED | OUTPATIENT
Start: 2024-05-23 | End: 2024-05-23 | Stop reason: HOSPADM

## 2024-05-23 RX ORDER — ONDANSETRON HYDROCHLORIDE 2 MG/ML
4 INJECTION, SOLUTION INTRAVENOUS ONCE AS NEEDED
Status: COMPLETED | OUTPATIENT
Start: 2024-05-23 | End: 2024-05-23

## 2024-05-23 RX ORDER — IPRATROPIUM BROMIDE 0.5 MG/2.5ML
500 SOLUTION RESPIRATORY (INHALATION) AS NEEDED
Status: DISCONTINUED | OUTPATIENT
Start: 2024-05-23 | End: 2024-05-23 | Stop reason: HOSPADM

## 2024-05-23 RX ORDER — LABETALOL HYDROCHLORIDE 5 MG/ML
5 INJECTION, SOLUTION INTRAVENOUS ONCE AS NEEDED
Status: DISCONTINUED | OUTPATIENT
Start: 2024-05-23 | End: 2024-05-23 | Stop reason: HOSPADM

## 2024-05-23 RX ORDER — FENTANYL CITRATE 50 UG/ML
50 INJECTION, SOLUTION INTRAMUSCULAR; INTRAVENOUS EVERY 5 MIN PRN
Status: DISCONTINUED | OUTPATIENT
Start: 2024-05-23 | End: 2024-05-23 | Stop reason: HOSPADM

## 2024-05-23 RX ORDER — PROPOFOL 10 MG/ML
INJECTION, EMULSION INTRAVENOUS AS NEEDED
Status: DISCONTINUED | OUTPATIENT
Start: 2024-05-23 | End: 2024-05-23

## 2024-05-23 RX ORDER — SODIUM CHLORIDE, SODIUM LACTATE, POTASSIUM CHLORIDE, CALCIUM CHLORIDE 600; 310; 30; 20 MG/100ML; MG/100ML; MG/100ML; MG/100ML
100 INJECTION, SOLUTION INTRAVENOUS CONTINUOUS
Status: DISCONTINUED | OUTPATIENT
Start: 2024-05-23 | End: 2024-05-23 | Stop reason: HOSPADM

## 2024-05-23 RX ORDER — LIDOCAINE HYDROCHLORIDE 10 MG/ML
INJECTION, SOLUTION EPIDURAL; INFILTRATION; INTRACAUDAL; PERINEURAL AS NEEDED
Status: DISCONTINUED | OUTPATIENT
Start: 2024-05-23 | End: 2024-05-23

## 2024-05-23 RX ORDER — MEPERIDINE HYDROCHLORIDE 25 MG/ML
12.5 INJECTION INTRAMUSCULAR; INTRAVENOUS; SUBCUTANEOUS EVERY 10 MIN PRN
Status: DISCONTINUED | OUTPATIENT
Start: 2024-05-23 | End: 2024-05-23 | Stop reason: HOSPADM

## 2024-05-23 RX ORDER — PROCHLORPERAZINE EDISYLATE 5 MG/ML
5 INJECTION INTRAMUSCULAR; INTRAVENOUS ONCE AS NEEDED
Status: COMPLETED | OUTPATIENT
Start: 2024-05-23 | End: 2024-05-23

## 2024-05-23 RX ORDER — CEFAZOLIN SODIUM 2 G/100ML
2 INJECTION, SOLUTION INTRAVENOUS ONCE
Status: COMPLETED | OUTPATIENT
Start: 2024-05-23 | End: 2024-05-23

## 2024-05-23 RX ORDER — HYDRALAZINE HYDROCHLORIDE 20 MG/ML
5 INJECTION INTRAMUSCULAR; INTRAVENOUS EVERY 30 MIN PRN
Status: DISCONTINUED | OUTPATIENT
Start: 2024-05-23 | End: 2024-05-23 | Stop reason: HOSPADM

## 2024-05-23 RX ORDER — ROCURONIUM BROMIDE 10 MG/ML
INJECTION, SOLUTION INTRAVENOUS AS NEEDED
Status: DISCONTINUED | OUTPATIENT
Start: 2024-05-23 | End: 2024-05-23

## 2024-05-23 RX ORDER — PHENAZOPYRIDINE HYDROCHLORIDE 100 MG/1
100 TABLET, FILM COATED ORAL 3 TIMES DAILY
Qty: 42 TABLET | Refills: 0 | Status: SHIPPED | OUTPATIENT
Start: 2024-05-23 | End: 2024-06-06

## 2024-05-23 RX ORDER — CEPHALEXIN 500 MG/1
500 CAPSULE ORAL 2 TIMES DAILY
Qty: 14 CAPSULE | Refills: 0 | Status: SHIPPED | OUTPATIENT
Start: 2024-05-23 | End: 2024-05-30

## 2024-05-23 RX ADMIN — MIDAZOLAM 2 MG: 1 INJECTION INTRAMUSCULAR; INTRAVENOUS at 07:00

## 2024-05-23 RX ADMIN — FENTANYL CITRATE 50 MCG: 50 INJECTION INTRAMUSCULAR; INTRAVENOUS at 07:31

## 2024-05-23 RX ADMIN — FENTANYL CITRATE 50 MCG: 50 INJECTION INTRAMUSCULAR; INTRAVENOUS at 09:01

## 2024-05-23 RX ADMIN — ROCURONIUM BROMIDE 10 MG: 10 INJECTION, SOLUTION INTRAVENOUS at 07:29

## 2024-05-23 RX ADMIN — ROCURONIUM BROMIDE 50 MG: 10 INJECTION, SOLUTION INTRAVENOUS at 07:06

## 2024-05-23 RX ADMIN — LIDOCAINE HYDROCHLORIDE 5 ML: 10 INJECTION, SOLUTION EPIDURAL; INFILTRATION; INTRACAUDAL; PERINEURAL at 07:05

## 2024-05-23 RX ADMIN — DEXAMETHASONE SODIUM PHOSPHATE 8 MG: 4 INJECTION, SOLUTION INTRAMUSCULAR; INTRAVENOUS at 07:07

## 2024-05-23 RX ADMIN — CEFAZOLIN SODIUM 2 G: 2 INJECTION, SOLUTION INTRAVENOUS at 07:07

## 2024-05-23 RX ADMIN — FENTANYL CITRATE 50 MCG: 50 INJECTION INTRAMUSCULAR; INTRAVENOUS at 07:07

## 2024-05-23 RX ADMIN — PROPOFOL 200 MG: 10 INJECTION, EMULSION INTRAVENOUS at 07:05

## 2024-05-23 RX ADMIN — PROCHLORPERAZINE EDISYLATE 5 MG: 5 INJECTION INTRAMUSCULAR; INTRAVENOUS at 09:39

## 2024-05-23 RX ADMIN — ONDANSETRON 4 MG: 2 INJECTION, SOLUTION INTRAMUSCULAR; INTRAVENOUS at 07:07

## 2024-05-23 RX ADMIN — SUGAMMADEX 200 MG: 100 INJECTION, SOLUTION INTRAVENOUS at 08:22

## 2024-05-23 RX ADMIN — MEPERIDINE HYDROCHLORIDE 12.5 MG: 25 INJECTION INTRAMUSCULAR; INTRAVENOUS; SUBCUTANEOUS at 08:45

## 2024-05-23 RX ADMIN — SODIUM CHLORIDE, SODIUM LACTATE, POTASSIUM CHLORIDE, AND CALCIUM CHLORIDE 100 ML/HR: 600; 310; 30; 20 INJECTION, SOLUTION INTRAVENOUS at 06:03

## 2024-05-23 RX ADMIN — ONDANSETRON 4 MG: 2 INJECTION INTRAMUSCULAR; INTRAVENOUS at 09:27

## 2024-05-23 ASSESSMENT — PAIN SCALES - GENERAL
PAINLEVEL_OUTOF10: 0 - NO PAIN
PAINLEVEL_OUTOF10: 0 - NO PAIN
PAINLEVEL_OUTOF10: 3
PAINLEVEL_OUTOF10: 3
PAINLEVEL_OUTOF10: 0 - NO PAIN
PAINLEVEL_OUTOF10: 3
PAINLEVEL_OUTOF10: 3
PAINLEVEL_OUTOF10: 0 - NO PAIN
PAINLEVEL_OUTOF10: 3
PAINLEVEL_OUTOF10: 7
PAINLEVEL_OUTOF10: 3

## 2024-05-23 ASSESSMENT — PAIN - FUNCTIONAL ASSESSMENT
PAIN_FUNCTIONAL_ASSESSMENT: WONG-BAKER FACES
PAIN_FUNCTIONAL_ASSESSMENT: 0-10

## 2024-05-23 ASSESSMENT — COLUMBIA-SUICIDE SEVERITY RATING SCALE - C-SSRS
1. IN THE PAST MONTH, HAVE YOU WISHED YOU WERE DEAD OR WISHED YOU COULD GO TO SLEEP AND NOT WAKE UP?: NO
2. HAVE YOU ACTUALLY HAD ANY THOUGHTS OF KILLING YOURSELF?: NO
6. HAVE YOU EVER DONE ANYTHING, STARTED TO DO ANYTHING, OR PREPARED TO DO ANYTHING TO END YOUR LIFE?: NO

## 2024-05-23 NOTE — DISCHARGE INSTRUCTIONS
Diet  You can eat whatever you like after your surgery. Sometimes the anesthesia can cause nausea, so it may be a good idea to stay away from heavy foods right after you get home from the procedure.    Singh catheter  You will have a tube in the bladder called a singh catheter. This drains urine from the bladder and exits the penis. Be sure the catheter is well secured to the leg at all times. This catheter typically stays in from one day to a week (7 days) depending on your circumstances. There should never be any tension or tugging on the catheter. Take care not to pull on the catheter when rolling in bed or changing position. The nurses will show you how to attach the singh catheter to a leg bag during the day and a big bag at night.    The singh catheter has a balloon on the end of it to keep it in place in the bladder. This may give you the feeling you need to urinate. Be assured the catheter is draining and the sensation is from the singh catheter balloon. You may also notice urine or blood-tinged urine leaking around the catheter out the tip of the penis. Typically, this due to a bladder spasm and is not a cause for alarm. If the catheter stops draining, get up and walk around. If it is still not draining, call the office or come to the emergency room as it may be clogged and need to be irrigated. Once the singh catheter is removed, it is normal to have burning and stinging with urination for a few weeks after surgery. It is also common to have more frequent urination and a greater sense of the urge to urinate. There may not be much warning from the time you feel the urge to urinate to the time when the bladder is ready to empty.    Activity  It is very important to walk after your procedure. Walking prevents blood clots in the legs or lungs. You may go up and down stairs. Avoid any strenuous activity or lifting more than ten pounds for 3 to 4 weeks. This includes any heavy lifting, running, riding a bicycle  or golf. This also includes activities such as raking leaves, mowing the lawn, shoveling snow or other strenuous chores. If you see blood in the urine, increase the amount of water you are drinking and avoid strenuous activity or heavy lifting until the blood clears.    Medications  Take the medications prescribed at the time of your discharge from the hospital. If you are taking any medications on a regular basis prior to your admission to the hospital, you should continue to take those as well. For any aches, pains or headaches, you may use Tylenol or Extra-Strength Tylenol. Sometimes, you will be given a prescription for other pain killers. Do not use any aspirin or aspirin-like compounds or ibuprofen products (NSAIDs) (eg. Advil, Nuprin, Motrin, Bufferin, etc.) for four weeks after surgery. If you start aspirin or aspirin like compounds and you notice blood in your urine, please stop taking it and increase your water intake. Pyridium will be called in that will help with burning.  It will color your urine orange.  Antibiotic will be also prescribed for 1 week.    Avoid constipation  Anesthesia, surgery and narcotic pain medication all increase your risk for constipation. Do not strain to move the bowels as this can impair the healing process and start bleeding. Take plenty of fiber, water and over the counter stool softener to avoid constipation. Stool softener can be taken by mouth twice a day to avoid constipation. A stool softener or laxative is available at any drug store without a prescription (senna or Senokot or SennaGen, Dulcolax or bisacodyl, Miralax, Metamucil, Milk of Magnesia or magnesium hydroxide). Decrease or hold the stool softener for diarrhea or loose stools.    Follow up plan  If you develop a fever greater than 101 degrees Fahrenheit, the catheter stops draining or you are unable to urinate, call the office or come to the emergency room.  Your catheter removal has been scheduled  Please call  [phone] and follow prompts for Dr. Pineda's  if you are not sure of your appointment time or location

## 2024-05-23 NOTE — ANESTHESIA POSTPROCEDURE EVALUATION
Patient: Raji Duggan    Procedure Summary       Date: 05/23/24 Room / Location: [facility] / [facility]    Anesthesia Start: 0700 Anesthesia Stop: 0835    Procedure: HoLEP ( Morcellator, HoLEP set , p120 laser , Holmium ) Diagnosis:       Benign localized prostatic hyperplasia with lower urinary tract symptoms (LUTS)      (Benign localized prostatic hyperplasia with lower urinary tract symptoms (LUTS) [N40.1])    Surgeons: Dinorah Pineda MD Responsible Provider: Jeremias Monzon MD    Anesthesia Type: general ASA Status: 2            Anesthesia Type: general    Vitals Value Taken Time   /72 05/23/24 1000   Temp 36.5 °C (97.7 °F) 05/23/24 1000   Pulse 57 05/23/24 1000   Resp 16 05/23/24 1000   SpO2 92 % 05/23/24 1000       Anesthesia Post Evaluation    Patient location during evaluation: PACU  Patient participation: complete - patient participated  Level of consciousness: awake  Pain management: adequate  Multimodal analgesia pain management approach  Airway patency: patent  Cardiovascular status: acceptable and hemodynamically stable  Respiratory status: acceptable and spontaneous ventilation  Hydration status: euvolemic  Postoperative Nausea and Vomiting: none  Comments: No nausea or vomiting        There were no known notable events for this encounter.

## 2024-05-23 NOTE — ANESTHESIA PROCEDURE NOTES
Airway  Date/Time: 5/23/2024 7:06 AM  Urgency: elective    Airway not difficult    Staffing  Performed: MINOR   Authorized by: Jeremias Monzon MD    Performed by: MINOR Johnston  Patient location during procedure: OR    Indications and Patient Condition  Indications for airway management: anesthesia and airway protection  Spontaneous Ventilation: absent  Sedation level: deep  Preoxygenated: yes  Patient position: sniffing  MILS maintained throughout  Mask difficulty assessment: 1 - vent by mask  Planned trial extubation    Final Airway Details  Final airway type: endotracheal airway      Successful airway: ETT  Cuffed: yes   Successful intubation technique: direct laryngoscopy  Facilitating devices/methods: intubating stylet  Endotracheal tube insertion site: oral  Blade: Rei  Blade size: #3  ETT size (mm): 8.0  Cormack-Lehane Classification: grade I - full view of glottis  Placement verified by: chest auscultation, capnometry and palpation of cuff   Measured from: lips  ETT to lips (cm): 22  Number of attempts at approach: 1

## 2024-05-23 NOTE — PERIOPERATIVE NURSING NOTE
Patient in Phase 2; dressed and up to chair with RN assist. Tolerating po fluids, no complaint of pain and no complaint of nausea.     Awaiting for wife to arrive

## 2024-05-23 NOTE — ANESTHESIA PREPROCEDURE EVALUATION
Patient: Raji Duggan    Procedure Information       Date/Time: 05/23/24 0700    Procedure: HoLEP ( Morcellator, HoLEP set , p120 laser , Holmium )    [facility]    Surgeons: Dinorah Pineda MD            Relevant Problems   Anesthesia (within normal limits)      Cardiac   (-) History of coronary artery bypass graft   (-) Pacemaker      Pulmonary   (-) Asthma (HHS-HCC)   (-) Chronic obstructive pulmonary disease (Multi)   (-) MODESTA (obstructive sleep apnea)      Neuro   (-) CVA (cerebral vascular accident) (Multi)   (-) Seizures (Multi)      /Renal   (+) Benign localized prostatic hyperplasia with lower urinary tract symptoms (LUTS)      Endocrine   (-) Diabetes mellitus, type 2 (Multi)      Hematology   (-) Coagulopathy (Multi)       Clinical information reviewed:   Tobacco  Allergies  Meds   Med Hx  Surg Hx   Fam Hx  Soc Hx        NPO Detail:  NPO/Void Status  Carbohydrate Drink Given Prior to Surgery? : N  Date of Last Liquid: 05/22/24  Time of Last Liquid: 2100  Date of Last Solid: 05/22/24  Time of Last Solid: 2100  Last Intake Type: Clear fluids  Time of Last Void: 0530         Physical Exam    Airway  Mallampati: I  TM distance: >3 FB  Neck ROM: full     Cardiovascular    Dental    Pulmonary    Abdominal            Anesthesia Plan    History of general anesthesia?: yes  History of complications of general anesthesia?: no    ASA 2     general     intravenous induction   Postoperative administration of opioids is intended.  Anesthetic plan and risks discussed with patient.  Use of blood products discussed with patient who consented to blood products.

## 2024-05-23 NOTE — OP NOTE
HoLEP ( Morcellator, HoLEP set , p120 laser , Holmium ) Operative Note     Date: 2024  OR Location: [facility]    Name: Raji Duggan, : 1950, Age: 73 y.o., MRN: [id number], Sex: male    Diagnosis  Pre-op Diagnosis     * Benign localized prostatic hyperplasia with lower urinary tract symptoms (LUTS) [N40.1] Post-op Diagnosis     * Benign localized prostatic hyperplasia with lower urinary tract symptoms (LUTS) [N40.1]     Procedures  HoLEP ( Morcellator, HoLEP set , p120 laser , Holmium )  78162 - OR LASER ENUCLEATION PROSTATE W/MORCELLATION      Surgeons      * Dinorah Pineda - Primary    Resident/Fellow/Other Assistant:  Surgeons and Role:  * No surgeons found with a matching role *    Procedure Summary  Anesthesia: General  ASA: II  Anesthesia Staff: Anesthesiologist: Jeremias Monzon MD  C-AA: MINOR Johnston  Estimated Blood Loss: 5mL  Intra-op Medications:   Administrations occurring from 0700 to 0900 on 24:   Medication Name Total Dose   lidocaine 2 % mucosal jelly (Uro-Jet) 1 Application   lactated Ringer's infusion Cannot be calculated   ceFAZolin in dextrose (iso-os) (Ancef) IVPB 2 g 2 g              Anesthesia Record               Intraprocedure I/O Totals          Intake    lactated Ringer's infusion 800.00 mL    ceFAZolin in dextrose (iso-os) (Ancef) IVPB 2 g 100.00 mL    Total Intake 900 mL       Output    Est. Blood Loss 20 mL    Total Output 20 mL       Net    Net Volume 880 mL          Specimen:   ID Type Source Tests Collected by Time   1 : SPECIMEN PLACED IN FORMALIN AND SENT TO LAB FOR ANALYSIS Tissue PROSTATE HOLEP SURGICAL PATHOLOGY EXAM Dinorah Pineda MD 2024 0813        Staff:   Circulator: Tori Alexandre Person: Stormy         Drains and/or Catheters:   Urethral Catheter  22 Fr. (Active)       Tourniquet Times:         Implants:     Findings: BPH, significant inflammation, pus in prostate    Indications: Raji Duggan is an 73 y.o. male who is having surgery for  Benign localized prostatic hyperplasia with lower urinary tract symptoms (LUTS) [N40.1].     The patient was seen in the preoperative area. The risks, benefits, complications, treatment options, non-operative alternatives, expected recovery and outcomes were discussed with the patient. The possibilities of reaction to medication, pulmonary aspiration, injury to surrounding structures, bleeding, recurrent infection, the need for additional procedures, failure to diagnose a condition, and creating a complication requiring transfusion or operation were discussed with the patient. The patient concurred with the proposed plan, giving informed consent.  The site of surgery was properly noted/marked if necessary per policy. The patient has been actively warmed in preoperative area. Preoperative antibiotics have been ordered and given within 1 hours of incision. Venous thrombosis prophylaxis have been ordered including bilateral sequential compression devices    Procedure Details: Preoperative diagnosis: BPH with LUTs    Postoperative diagnosis: same    Procedure: Holmium laser nucleation of prostate and tissue morcellation    Anesthesia: general    IVF: see anesthesia report    EBL: 5 ml    Complications: none    Catheters: 22 Fr 3-way Briggs catheter    Specimen: prostate chips    Disposition: PACU in stable condition       Enucleation time: 22  Total laser time: 36  Total energy used: 177,860       Patient is a 73 year-old male with significant obstructive and irritative voiding symptoms not responsive to maximal medical therapy.  Ambulatory evaluation demonstrated him to be a good candidate for HoLEP procedure.  Risks and alternatives were discussed in great detail, he singed the informed consent and agreed to proceed    50 ml of lubricant were injected to the urethra.  Urethral meatus was dilated with repeat sounds to 30 Fr caliber    A 26 Filipino Leach resectoscope was inserted.  The anterior urethra was normal, there  was good coaptation of the membranous urethra, there was a large obstructing prostate.  The bladder was normal without stones or lesions.  Both ureteral orifices were identified.    We started the enucleation using a 550 µm holmium laser fiber.    A circumferential incision was made in the urethra proximal to the sphincter.  It was deepened anteriorly and laterally.  We then entered the space between the capsule and the adenoma bluntly lateral to the verumontanum.  This was done bilaterally.  The posterior plane was developed bilaterally and connected by cutting the fibers proximal to the verumontanum.  The resection was carried as far proximally as possible.    We then returned to the initial incision in the urethra and detached the lateral attachments between the sphincter and the adenoma.  We were able to identify the lateral plane and developed it as proximally as possible by connecting it to the posterior plane.    We then turned to free the anterior portion of the sphincter.  The attachments between the sphincter and the anterior adenoma were taken down with the laser fiber until we met the anterior plan.  We then connected all planes circumferentially.  We continued the enucleation circumferentially until we reached the bladder neck.  The bladder neck was entered anteriorly and good hemostasis was obtained.  Then the bladder neck incision was developed circumferentially around the adenoma.  Adenoma was then rolled into the bladder and residual posterior attachments were removed.    Hemostasis was performed.The laser bridge was removed and the nephroscope loaded with the Piranha morcellator was inserted.  2 irrigations were connected to distended bladder.  Tissue was completely morcellated.    The laser bridge was inserted again and meticulous hemostasis was performed.  I verified that there was no residual tissue in the bladder, and that ureteral orifices were free.    The laser apparatus was removed and a 22  Armenian three-way catheter was inserted and connected to irrigation.    Patient was extubated and moved to the postoperative area in stable condition.         Disposition: patient will have a trial of void on postoperative day one    Complications:  None; patient tolerated the procedure well.    Disposition: PACU - hemodynamically stable.  Condition: stable         Additional Details: severe inflammation noted with pus in prostate tissue    Attending Attestation: I was present and scrubbed for the entire procedure.    Dinorah Pineda  Phone Number: [phone]

## 2024-05-24 ENCOUNTER — OFFICE VISIT (OUTPATIENT)
Dept: UROLOGY | Facility: CLINIC | Age: 74
End: 2024-05-24
Payer: MEDICARE

## 2024-05-24 ENCOUNTER — APPOINTMENT (OUTPATIENT)
Dept: UROLOGY | Facility: HOSPITAL | Age: 74
End: 2024-05-24
Payer: MEDICARE

## 2024-05-24 VITALS
BODY MASS INDEX: 25.48 KG/M2 | TEMPERATURE: 97.7 F | HEIGHT: 71 IN | WEIGHT: 182 LBS | SYSTOLIC BLOOD PRESSURE: 177 MMHG | HEART RATE: 50 BPM | DIASTOLIC BLOOD PRESSURE: 78 MMHG

## 2024-05-24 DIAGNOSIS — N40.1 BENIGN LOCALIZED PROSTATIC HYPERPLASIA WITH LOWER URINARY TRACT SYMPTOMS (LUTS): Primary | ICD-10-CM

## 2024-05-24 PROCEDURE — 1160F RVW MEDS BY RX/DR IN RCRD: CPT | Performed by: NURSE PRACTITIONER

## 2024-05-24 PROCEDURE — 1159F MED LIST DOCD IN RCRD: CPT | Performed by: NURSE PRACTITIONER

## 2024-05-24 PROCEDURE — 51700 IRRIGATION OF BLADDER: CPT | Performed by: NURSE PRACTITIONER

## 2024-05-24 PROCEDURE — 1036F TOBACCO NON-USER: CPT | Performed by: NURSE PRACTITIONER

## 2024-05-24 NOTE — PROGRESS NOTES
05/24/24   [id number]    Chief Complaint   Patient presents with    TOV      Subjective      HPI Raji Duggan is a 73 y.o. male who presents for TOV. S/p HoLEP by Dr. Pineda on 5/23/24. 250 ml instilled, voided 160 ml; no constipation, no fever or symptoms of infection.     PMH, PSH, FH, SH reviewed    Objective     There were no vitals taken for this visit.   Physical Exam  General: Appears comfortable and in no apparent distress, well nourished  Head: Normocephalic, atraumatic  Neck: trachea midline  Respiratory: respirations unlabored, no wheezes, and no use of accessory muscles  Cardiovascular: at rest no dyspnea, well perfused  Skin: no visible rashes or lesions  Neurologic: grossly intact, oriented to person, place, and time  Psychiatric: mood and affect appropriate  Musculoskeletal: in chair for appt. no difficulty w upper body movement      Assessment/Plan   Problem List Items Addressed This Visit          Genitourinary and Reproductive    Benign localized prostatic hyperplasia with lower urinary tract symptoms (LUTS) - Primary     No orders of the defined types were placed in this encounter.     Plan:   Call if unable to urinate or symptoms of UTI, go to ER if weekend or evening  Call if fever, chills, nausea or vomiting  Discussed constipation prevention, MiraLAX or Colace  Follow up as scheduled  call nurse line sooner if any concerns on nonemergency nurse line       KARLENE Canales-CNP  Lab Results   Component Value Date    GLUCOSE 102 (H) 05/13/2024    CALCIUM 8.9 05/13/2024     05/13/2024    K 4.6 05/13/2024    CO2 27 05/13/2024     (H) 05/13/2024    BUN 15 05/13/2024    CREATININE 0.94 05/13/2024

## 2024-05-24 NOTE — PATIENT INSTRUCTIONS
Plan:   Call if unable to urinate or symptoms of UTI, go to ER if weekend or evening  Call if fever, chills, nausea or vomiting  Discussed constipation prevention, MiraLAX or Colace  Follow up as scheduled  call nurse line sooner if any concerns on nonemergency nurse line

## 2024-05-29 LAB
LABORATORY COMMENT REPORT: NORMAL
PATH REPORT.FINAL DX SPEC: NORMAL
PATH REPORT.GROSS SPEC: NORMAL
PATH REPORT.RELEVANT HX SPEC: NORMAL
PATH REPORT.TOTAL CANCER: NORMAL

## 2024-05-30 ENCOUNTER — TREATMENT (OUTPATIENT)
Dept: PHYSICAL THERAPY | Facility: CLINIC | Age: 74
End: 2024-05-30
Payer: MEDICARE

## 2024-05-30 DIAGNOSIS — M72.2 PLANTAR FASCIITIS: ICD-10-CM

## 2024-05-30 DIAGNOSIS — M79.671 RIGHT FOOT PAIN: Primary | ICD-10-CM

## 2024-05-30 PROCEDURE — 97110 THERAPEUTIC EXERCISES: CPT | Mod: GP | Performed by: PHYSICAL THERAPIST

## 2024-05-30 NOTE — PROGRESS NOTES
"Physical Therapy    Physical Therapy Treatment    Patient Name: Raji Duggan  MRN: [id number]  Today's Date: 5/30/2024  Time Calculation  Start Time: 1005  Stop Time: 1040  Time Calculation (min): 35 min  PT Therapeutic Procedures Time Entry  Therapeutic Exercise Time Entry: 28  Insurance: Medicare  Certification: 5/14/24 - 8/12/24  PT visit limit: Medical necessity  Visit #3    Assessment:  Pt responding well to current program.    Plan:  STM to calf and plantar fascia.    Current Problem  1. Right foot pain  Follow Up In Physical Therapy      2. Plantar fasciitis  Follow Up In Physical Therapy          Subjective    \"My foot is feeling much better. Still stiff in the morning but it resolves much quicker. It's 90% better since we started. I strained my back I think swinging a golf club last week so I haven't been able to do the side leg lifts.\" Pt had a procedure done last week and is unable to do anything too strenuous for a few weeks.    Pain  R knee 2/10, R foot 1/10    Objective   Continues with TTP to medial calcaneal tubercle.    Treatments:  Dry Needling:  Pt informed of risks and consent obtained.  Pt prone. Skin and surrounding area cleaned and prepped with isopropyl alcohol wipe. 1- 1\" needle inserted into R heel at medial calcaneal tubercle. Total treatment time was 5 mins.     Therapeutic Exercise:   Seated towel calf stretch 3x30 sec R  Seated black band tib posterior strengthening 2x20   SLR 2# 2x10  SAQ 2# 2x10  Seated arch lifts x20  Standing gastroc stretch 3x30 sec R  Standing soleus stretch 3x30 sec R    Goals:  R knee pain Problems       R knee pain Problems (Active)       PT Problem       Increase R knee extension AROM to 0 degrees.       Start:  04/02/24    Expected End:  06/01/24            Increase length in B hamstrings by 5 degrees.       Start:  04/02/24    Expected End:  06/01/24            Increase strength in B hip and knee MMT by at least 1/3 grade.       Start:  04/02/24    Expected " End:  06/01/24            Pt will maintain R SLS for at least 15 seconds on firm surface without LOB.       Start:  04/02/24    Expected End:  06/01/24            Pt will report at least 75% decrease in R knee pain to improve ADLs.       Start:  04/02/24    Expected End:  06/01/24                 R heel pain Problems       R heel pain Problems (Active)       PT Problem       Increase B tibialis posterior and soleus MMT to 5/5 and B FHL to at least 4+/5 to improve dynamic medial arch support with weight-bearing activities.       Start:  05/15/24    Expected End:  07/13/24            Increase B gastroc length to 8-10 degrees to reduce posterior forces on the ankle and foot contributing to stress on plantar fascia.       Start:  05/15/24    Expected End:  07/13/24            Pt will report at least 75% decrease in R heel/foot pain to improve walking and playing tennis.       Start:  05/15/24    Expected End:  07/13/24            Pt will demonstrate independence with HEP for proper self-management at home.       Start:  05/15/24    Expected End:  07/13/24

## 2024-06-03 ENCOUNTER — TREATMENT (OUTPATIENT)
Dept: PHYSICAL THERAPY | Facility: CLINIC | Age: 74
End: 2024-06-03
Payer: MEDICARE

## 2024-06-03 DIAGNOSIS — M72.2 PLANTAR FASCIITIS: ICD-10-CM

## 2024-06-03 DIAGNOSIS — M79.671 RIGHT FOOT PAIN: ICD-10-CM

## 2024-06-03 PROCEDURE — L3030 FOOT ARCH SUPPORT REMOV PREM: HCPCS | Performed by: SPECIALIST/TECHNOLOGIST

## 2024-06-03 ASSESSMENT — PAIN - FUNCTIONAL ASSESSMENT: PAIN_FUNCTIONAL_ASSESSMENT: 0-10

## 2024-06-03 NOTE — PROGRESS NOTES
Physical Therapy  Physical Therapy Treatment    Patient Name: Raji Duggan  MRN: [id number]  Today's Date: 6/3/2024  Time Calculation  Start Time: 0855  Stop Time: 0825  Time Calculation (min): 1410 min    Current Problem  1. Plantar fasciitis  Follow Up In Physical Therapy      2. Right foot pain  Follow Up In Physical Therapy          Precautions  Precautions  STEADI Fall Risk Score (The score of 4 or more indicates an increased risk of falling): 0  Precautions Comment: none  Pain  Pain Assessment: 0-10  Pain Score:  (varies with activity)    Insurance:   Visit: 4 of [facility]  Authorization: No Auth Needed  Medicare  Cert date: 5/14/24-8/12/24      Subjective:   Subjective   Patient reports history of L foot surgery and R plantar fasciitis.  Patient currently in PT at [facility].      Objective:   B pronation  B gao's toe         Treatments:     fabricated Foot Support tech Men's size 11 (Fabrifit/XRD) with thermal cork add on  reviewed wear and care directions  reviewed modes of failure     Charges:  FT x2 (cq)     Assessment: Patient noted immediate support upon fabrication.         Plan: Continue PT at [facility] per plan of care developed by Raji Acuña, PT.   Monitor orthotic wear and adjust if necessary.         Calvin Cerrato, PTA

## 2024-06-06 ENCOUNTER — TREATMENT (OUTPATIENT)
Dept: PHYSICAL THERAPY | Facility: CLINIC | Age: 74
End: 2024-06-06
Payer: MEDICARE

## 2024-06-06 DIAGNOSIS — M79.671 RIGHT FOOT PAIN: Primary | ICD-10-CM

## 2024-06-06 DIAGNOSIS — M72.2 PLANTAR FASCIITIS: ICD-10-CM

## 2024-06-06 PROCEDURE — 97110 THERAPEUTIC EXERCISES: CPT | Mod: GP | Performed by: PHYSICAL THERAPIST

## 2024-06-06 PROCEDURE — 97140 MANUAL THERAPY 1/> REGIONS: CPT | Mod: GP | Performed by: PHYSICAL THERAPIST

## 2024-06-06 NOTE — PROGRESS NOTES
"Physical Therapy    Physical Therapy Treatment    Patient Name: Raji Duggan  MRN: [id number]  Today's Date: 6/6/2024  Time Calculation  Start Time: 1115  Stop Time: 1200  Time Calculation (min): 45 min  PT Therapeutic Procedures Time Entry  Manual Therapy Time Entry: 8  Therapeutic Exercise Time Entry: 30  Insurance: Medicare  Certification: 5/14/24 - 8/12/24  PT visit limit: Medical necessity  Visit #4    Assessment:  Progresses to arch lifts in standing maintaining proper recruitment of foot intrinsics.    Plan:  IASTM to R calf. Progress to calf stretching on incline.    Current Problem  1. Right foot pain  Follow Up In Physical Therapy      2. Plantar fasciitis  Follow Up In Physical Therapy          Subjective    \"I have the new orthotics now and they feel like they're helping. Everything is improving. Every once in a while my knee gets irritated but it's not as severe and it goes away if I rest or change my gait.\"    Pain  R knee <1/10, R foot 0/10    Objective   R gastroc length 5 degrees    Treatments:  Manual Therapy:   IASTM to R plantar fascia    Therapeutic Exercise:   Bike x 5 mins  Seated towel calf stretch 3x30 sec R  Seated black band tib posterior strengthening 2x20   SLR 2# 2x10  SAQ 2# 2x10  Standing arch lifts x20  Standing gastroc stretch 3x30 sec R  Standing soleus stretch 3x30 sec R    Goals:  R knee pain Problems       R knee pain Problems (Active)       PT Problem       Increase R knee extension AROM to 0 degrees.       Start:  04/02/24    Expected End:  06/01/24            Increase length in B hamstrings by 5 degrees.       Start:  04/02/24    Expected End:  06/01/24            Increase strength in B hip and knee MMT by at least 1/3 grade.       Start:  04/02/24    Expected End:  06/01/24            Pt will maintain R SLS for at least 15 seconds on firm surface without LOB.       Start:  04/02/24    Expected End:  06/01/24            Pt will report at least 75% decrease in R knee pain to " improve ADLs.       Start:  04/02/24    Expected End:  06/01/24                 R heel pain Problems       R heel pain Problems (Active)       PT Problem       Increase B tibialis posterior and soleus MMT to 5/5 and B FHL to at least 4+/5 to improve dynamic medial arch support with weight-bearing activities.       Start:  05/15/24    Expected End:  07/13/24            Increase B gastroc length to 8-10 degrees to reduce posterior forces on the ankle and foot contributing to stress on plantar fascia.       Start:  05/15/24    Expected End:  07/13/24            Pt will report at least 75% decrease in R heel/foot pain to improve walking and playing tennis.       Start:  05/15/24    Expected End:  07/13/24            Pt will demonstrate independence with HEP for proper self-management at home.       Start:  05/15/24    Expected End:  07/13/24

## 2024-06-24 ENCOUNTER — OFFICE VISIT (OUTPATIENT)
Dept: ORTHOPEDIC SURGERY | Facility: CLINIC | Age: 74
End: 2024-06-24
Payer: MEDICARE

## 2024-06-24 DIAGNOSIS — M72.2 PLANTAR FASCIITIS: Primary | ICD-10-CM

## 2024-06-24 PROBLEM — N32.89 SPASM OF BLADDER: Status: ACTIVE | Noted: 2024-06-24

## 2024-06-24 PROBLEM — H52.223 REGULAR ASTIGMATISM OF BOTH EYES: Status: ACTIVE | Noted: 2023-08-03
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
## 2024-06-24 PROBLEM — R39.9 LOWER URINARY TRACT SYMPTOMS (LUTS): Status: ACTIVE | Noted: 2023-08-14

## 2024-06-24 PROBLEM — R30.0 DYSURIA: Status: ACTIVE | Noted: 2024-06-24

## 2024-06-24 PROBLEM — J01.90 ACUTE SINUSITIS: Status: ACTIVE | Noted: 2024-06-24

## 2024-06-24 PROBLEM — R31.9 HEMATURIA: Status: ACTIVE | Noted: 2024-06-24

## 2024-06-24 PROBLEM — H35.30 MACULAR DEGENERATION OF BOTH EYES: Status: ACTIVE | Noted: 2023-08-03

## 2024-06-24 PROBLEM — R33.9 URINARY RETENTION: Status: ACTIVE | Noted: 2024-06-24

## 2024-06-24 PROBLEM — T83.9XXA URINARY CATHETER COMPLICATION (CMS-HCC): Status: ACTIVE | Noted: 2024-06-24

## 2024-06-24 PROBLEM — H52.13 MYOPIA OF BOTH EYES: Status: ACTIVE | Noted: 2023-08-03

## 2024-06-24 PROBLEM — C44.321 SQUAMOUS CELL CARCINOMA OF SKIN OF NOSE: Status: ACTIVE | Noted: 2022-03-01

## 2024-06-24 PROBLEM — M25.511 RIGHT SHOULDER PAIN: Status: ACTIVE | Noted: 2024-06-24

## 2024-06-24 PROBLEM — R49.0 HOARSENESS, CHRONIC: Status: ACTIVE | Noted: 2024-06-24

## 2024-06-24 PROBLEM — H25.13 NUCLEAR SCLEROSIS OF BOTH EYES: Status: ACTIVE | Noted: 2023-08-03

## 2024-06-24 PROBLEM — H93.13 SUBJECTIVE TINNITUS, BILATERAL: Status: ACTIVE | Noted: 2024-06-24

## 2024-06-24 PROBLEM — E78.2 COMBINED HYPERLIPIDEMIA: Status: ACTIVE | Noted: 2024-06-24

## 2024-06-24 PROBLEM — H90.5 UNSPECIFIED SENSORINEURAL HEARING LOSS: Status: ACTIVE | Noted: 2024-06-24

## 2024-06-24 PROBLEM — J32.9 RECURRENT SINUSITIS: Status: ACTIVE | Noted: 2024-06-24

## 2024-06-24 PROBLEM — H52.7 REFRACTION ERROR: Status: ACTIVE | Noted: 2023-08-03

## 2024-06-24 PROBLEM — H69.93 ETD (EUSTACHIAN TUBE DYSFUNCTION), BILATERAL: Status: ACTIVE | Noted: 2024-06-24

## 2024-06-24 PROBLEM — H04.123 DRY EYES, BILATERAL: Status: ACTIVE | Noted: 2024-04-04

## 2024-06-24 PROBLEM — H61.23 IMPACTED CERUMEN OF BOTH EARS: Status: ACTIVE | Noted: 2024-06-24

## 2024-06-24 PROBLEM — Q14.1 CONGENITAL HYPERTROPHY OF RETINAL PIGMENT EPITHELIUM: Status: ACTIVE | Noted: 2024-04-04

## 2024-06-24 PROBLEM — M75.81 TENDINITIS OF RIGHT ROTATOR CUFF: Status: ACTIVE | Noted: 2024-06-24

## 2024-06-24 PROBLEM — K64.5 HEMORRHOIDS, THROMBOSED: Status: ACTIVE | Noted: 2024-06-24

## 2024-06-24 PROBLEM — K40.20 BILATERAL INGUINAL HERNIA (BIH): Status: ACTIVE | Noted: 2024-06-24

## 2024-06-24 PROBLEM — M75.41 IMPINGEMENT SYNDROME OF RIGHT SHOULDER: Status: ACTIVE | Noted: 2024-06-24

## 2024-06-24 PROBLEM — R10.9 ABDOMINAL PAIN: Status: ACTIVE | Noted: 2024-06-24

## 2024-06-24 PROBLEM — R31.0 FRANK HEMATURIA: Status: ACTIVE | Noted: 2024-06-24

## 2024-06-24 PROBLEM — H52.4 PRESBYOPIA OF BOTH EYES: Status: ACTIVE | Noted: 2023-08-03

## 2024-06-24 PROCEDURE — 2500000004 HC RX 250 GENERAL PHARMACY W/ HCPCS (ALT 636 FOR OP/ED): Performed by: ORTHOPAEDIC SURGERY

## 2024-06-24 PROCEDURE — 2500000005 HC RX 250 GENERAL PHARMACY W/O HCPCS: Performed by: ORTHOPAEDIC SURGERY

## 2024-06-24 PROCEDURE — 1125F AMNT PAIN NOTED PAIN PRSNT: CPT | Performed by: ORTHOPAEDIC SURGERY

## 2024-06-24 PROCEDURE — 1036F TOBACCO NON-USER: CPT | Performed by: ORTHOPAEDIC SURGERY

## 2024-06-24 PROCEDURE — 99213 OFFICE O/P EST LOW 20 MIN: CPT | Performed by: ORTHOPAEDIC SURGERY

## 2024-06-24 PROCEDURE — 1159F MED LIST DOCD IN RCRD: CPT | Performed by: ORTHOPAEDIC SURGERY

## 2024-06-24 PROCEDURE — 20605 DRAIN/INJ JOINT/BURSA W/O US: CPT | Performed by: ORTHOPAEDIC SURGERY

## 2024-06-24 RX ORDER — LIDOCAINE HYDROCHLORIDE 20 MG/ML
1 INJECTION, SOLUTION INFILTRATION; PERINEURAL
Status: COMPLETED | OUTPATIENT
Start: 2024-06-24 | End: 2024-06-24

## 2024-06-24 RX ORDER — METHYLPREDNISOLONE ACETATE 40 MG/ML
40 INJECTION, SUSPENSION INTRA-ARTICULAR; INTRALESIONAL; INTRAMUSCULAR; SOFT TISSUE
Status: COMPLETED | OUTPATIENT
Start: 2024-06-24 | End: 2024-06-24

## 2024-06-24 ASSESSMENT — PAIN - FUNCTIONAL ASSESSMENT: PAIN_FUNCTIONAL_ASSESSMENT: 0-10

## 2024-06-24 ASSESSMENT — PAIN DESCRIPTION - DESCRIPTORS: DESCRIPTORS: ACHING;SORE

## 2024-06-24 ASSESSMENT — PAIN SCALES - GENERAL: PAINLEVEL_OUTOF10: 4

## 2024-06-24 NOTE — PROGRESS NOTES
Returns for right foot.  Felt he got some relief with physical therapy.  In early June new custom orthotics were made.  He has had pain when he tries to return to impact golf and tennis.    Exam: Point tender right heel plantar fascia.  Adequate fat pad.    Assessment: Chronic plan of fasciitis right heel.    Plan: Discussed nonoperative and operative options in detail.   Risk and benefits discussed in detail. All questions answered today.  Recovery timeline and expectations discussed in detail.  Offered cortisone injection.  Continue with therapy exercises.  Continue supportive shoewear.  Low impact exercise for 2 weeks.  Discussed possible advanced imaging and surgical options if pain continues.  After informed consent under sterile conditions the right plantar fascia was injected with a combination of  1cc 2% lidocaine and 40mg Methylprednisolone.  Risks and benefits were discussed in detail.  Patient ID: Raji Duggan is a 73 y.o. male.    M Inj/Asp: L plantar fascia on 6/24/2024 1:30 PM  Indications: pain  Details: 22 G needle, anterior approach  Medications: 40 mg methylPREDNISolone acetate 40 mg/mL; 1 mL lidocaine 20 mg/mL (2 %)  Procedure, treatment alternatives, risks and benefits explained, specific risks discussed. Consent was given by the patient. Immediately prior to procedure a time out was called to verify the correct patient, procedure, equipment, support staff and site/side marked as required. Patient was prepped and draped in the usual sterile fashion.

## 2024-06-25 ENCOUNTER — OFFICE VISIT (OUTPATIENT)
Dept: CARDIOLOGY | Facility: HOSPITAL | Age: 74
End: 2024-06-25
Payer: MEDICARE

## 2024-06-25 VITALS
WEIGHT: 181.2 LBS | OXYGEN SATURATION: 98 % | SYSTOLIC BLOOD PRESSURE: 146 MMHG | BODY MASS INDEX: 25.37 KG/M2 | DIASTOLIC BLOOD PRESSURE: 75 MMHG | HEART RATE: 44 BPM | HEIGHT: 71 IN

## 2024-06-25 DIAGNOSIS — I10 ESSENTIAL HYPERTENSION: ICD-10-CM

## 2024-06-25 DIAGNOSIS — I51.89 DIASTOLIC DYSFUNCTION: ICD-10-CM

## 2024-06-25 DIAGNOSIS — E78.2 COMBINED HYPERLIPIDEMIA: Primary | ICD-10-CM

## 2024-06-25 PROCEDURE — 1159F MED LIST DOCD IN RCRD: CPT | Performed by: INTERNAL MEDICINE

## 2024-06-25 PROCEDURE — 3078F DIAST BP <80 MM HG: CPT | Performed by: INTERNAL MEDICINE

## 2024-06-25 PROCEDURE — 1160F RVW MEDS BY RX/DR IN RCRD: CPT | Performed by: INTERNAL MEDICINE

## 2024-06-25 PROCEDURE — 99214 OFFICE O/P EST MOD 30 MIN: CPT | Mod: 25 | Performed by: INTERNAL MEDICINE

## 2024-06-25 PROCEDURE — 3077F SYST BP >= 140 MM HG: CPT | Performed by: INTERNAL MEDICINE

## 2024-06-25 PROCEDURE — 93005 ELECTROCARDIOGRAM TRACING: CPT | Performed by: INTERNAL MEDICINE

## 2024-06-25 PROCEDURE — 1126F AMNT PAIN NOTED NONE PRSNT: CPT | Performed by: INTERNAL MEDICINE

## 2024-06-25 RX ORDER — LOSARTAN POTASSIUM 50 MG/1
75 TABLET ORAL DAILY
Qty: 45 TABLET | Refills: 11 | Status: SHIPPED | OUTPATIENT
Start: 2024-06-25 | End: 2025-06-25

## 2024-06-25 ASSESSMENT — PAIN SCALES - GENERAL: PAINLEVEL: 0-NO PAIN

## 2024-06-25 NOTE — PATIENT INSTRUCTIONS
Thank you for attending the cardiology clinic at [facility] today.  It was nice to meet you.    Your cardiovascular examination today and EKG was within normal limits.      Your recent echocardiogram showed normal heart muscle contraction with some diastolic dysfunction [impaired relaxation] which is often apparent with age.  There was no evidence of heart failure.    Your blood pressure recordings mildly above target.  Increase usual losartan dose to 75 mg daily.  Target blood pressure is less than 130/85 mmHg.    Your target LDL cholesterol is less than 70 mg/dL.  Your atorvastatin dose can be adjusted to achieve this.    Continue to engage in regular aerobic exercise for your heart health.    You may follow-up with your PCP, however if you have any issues I will arrange to see you in clinic.

## 2024-06-25 NOTE — PROGRESS NOTES
Subjective   Raji Duggan is a 73 y.o. male presents for cardiology review on a background of HTN, HLD, GERD.     Investigations:  TTE [4/2024]-LVEF 55 to 60%, diastolic dysfunction, moderate left atrial dilatation, mild-moderate right atrial dilatation, mild mitral regurgitation, normal IVC size.  RVSP 30 mmHg.  ECG [5/2023] - sinus bradycardia (44 bpm), normal conduction intervals.    Chief Complaint:  ECHO results    [facility]  Active, retired 72yo  - plays tennis. Currently limited by plantar fasciitis.  - previously rode bicycle regularly, stopped recently following prostate procedure.     No chest pain or shortness of breath.     No leg swelling, orthopnea or PND.    Good diet - vegetables, prepares meals.     Longstanding sinus bradycardia, noted since he was a child.     TTE showed evidence of diastolic dysfunction with non-elevated RVSP and normal caliber IVC.    No palpitations.     CV risk:  Fam hx - father congestive heart failure   Non-smoker.  HTN - checks BP at home, /75 mmHg. On losartan 50mg daily. Tried amlodipine but had significant leg swelling.   Cr 0.94  HLD - LDL 64 mg/dL  HbA1c 5.2%.    ROS  A 10-system review was performed and was unremarkable apart from what is presented in the HPI.     Objective   Physical Exam  Alert and orientated.   Appropriate responses, normal affect.  No respiratory distress at rest.   Skin warm and dry.   Normal radial pulse character and volume. Clinically SR.   Anicteric sclera, no conjunctival pallor.   No JVD or carotid bruits.  Heart sounds dual, no added heart sounds or audible murmurs.   Chest clear on auscultation.   Calves soft, non-tender.  No pedal edema.  EKG (6/25) - sinus bradycardia, no ST changes.       Lab Review:   Lab Results   Component Value Date     05/13/2024    K 4.6 05/13/2024     (H) 05/13/2024    CO2 27 05/13/2024    BUN 15 05/13/2024    CREATININE 0.94 05/13/2024    GLUCOSE 102 (H) 05/13/2024    CALCIUM 8.9 05/13/2024     No  "results found for: \"CKTOTAL\", \"CKMB\", \"CKMBINDEX\", \"TROPONINI\"  Lab Results   Component Value Date    WBC 7.5 05/13/2024    HGB 13.5 05/13/2024    HCT 39.7 (L) 05/13/2024    MCV 89 05/13/2024     05/13/2024     Lab Results   Component Value Date    CHOL 226 (H) 12/01/2023    TRIG 115 12/01/2023    HDL 67.8 12/01/2023       Assessment/Plan   In summary, Raji Duggan is a 73 y.o. male presents for cardiology review on a background of HTN, HLD, GERD.  He is currently asymptomatic from cardiac viewpoint with good reported exercise capacity and no chest pain or dyspnea.  He presents having undergone an echocardiogram for investigation of lower limb edema that was ultimately attributed to amlodipine.  The echocardiogram did show evidence of diastolic dysfunction and left atrial dilatation with normal estimated pulmonary pressures and a nondilated IVC.  There is no prior history of heart failure.  On examination today he was euvolemic and mildly hypertensive.  His EKG showed sinus bradycardia, with no ST-T changes.  He has several cardiovascular risk factors including hypertension where his SBP on home recordings is usually 130-140s mmHg. he takes atorvastatin 20 mg for management of hyperlipidemia and his LDL is satisfactory at 64 mg/dL.  He is not diabetic and he is a non-smoker.  At this point I advised him to increase his losartan dose to 75 mg daily with a target blood pressure of less than 130/85 mmHg.  Also his echocardiogram shows some evidence of diastolic dysfunction there is no clinical evidence of heart failure manage advised him to continue his healthy diet and exercise regime.  He will follow-up with his PCP and I will be happy to review as needed.            "

## 2024-06-29 LAB
ATRIAL RATE: 45 BPM
P AXIS: 38 DEGREES
P OFFSET: 196 MS
P ONSET: 143 MS
PR INTERVAL: 148 MS
Q ONSET: 217 MS
QRS COUNT: 7 BEATS
QRS DURATION: 100 MS
QT INTERVAL: 482 MS
QTC CALCULATION(BAZETT): 416 MS
QTC FREDERICIA: 437 MS
R AXIS: 21 DEGREES
T AXIS: 11 DEGREES
T OFFSET: 458 MS
VENTRICULAR RATE: 45 BPM

## 2024-07-15 ENCOUNTER — TREATMENT (OUTPATIENT)
Dept: PHYSICAL THERAPY | Facility: CLINIC | Age: 74
End: 2024-07-15
Payer: MEDICARE

## 2024-07-15 DIAGNOSIS — M72.2 PLANTAR FASCIITIS: ICD-10-CM

## 2024-07-15 DIAGNOSIS — M79.671 RIGHT FOOT PAIN: Primary | ICD-10-CM

## 2024-07-15 PROCEDURE — 97140 MANUAL THERAPY 1/> REGIONS: CPT | Mod: GP | Performed by: PHYSICAL THERAPIST

## 2024-07-15 PROCEDURE — 97110 THERAPEUTIC EXERCISES: CPT | Mod: GP | Performed by: PHYSICAL THERAPIST

## 2024-07-15 NOTE — PROGRESS NOTES
"Physical Therapy    Physical Therapy Treatment    Patient Name: Raji Duggan  MRN: [id number]  Today's Date: 7/15/2024  Time Calculation  Start Time: 0950  Stop Time: 1035  Time Calculation (min): 45 min  PT Therapeutic Procedures Time Entry  Manual Therapy Time Entry: 15  Therapeutic Exercise Time Entry: 25  Insurance: Medicare  Certification: 5/14/24 - 8/12/24  PT visit limit: Medical necessity  Visit #5    Assessment:  Pt continuing to respond well to current program. Progressed to calf stretch on incline without difficulty.    Plan:  Add leg press.      Problem List Items Addressed This Visit             ICD-10-CM    Right foot pain - Primary M79.671     Other Visit Diagnoses         Codes    Plantar fasciitis     M72.2            Subjective    \"In the last week or so I felt like I was walking on the outside of my foot so the discomfort has moved there. I called the [facility] to see if someone could look at the orthotic and will see them later today. Today was the first time I played tennis since 6/27 and it went really well except that area.\"    Pain  R knee 0/10, R foot 1/10    Objective   R gastroc length 5 degrees    Treatments:  Dry Needling:  Pt informed of risks and consent obtained.  Pt prone. Skin and surrounding area cleaned and prepped with isopropyl alcohol wipe. 1- 1\" needle inserted into R heel at medial calcaneal tubercle. Total treatment time was 5 mins.     Manual Therapy:   IASTM to R plantar fascia  IASTM to R calf    Therapeutic Exercise:   Bike x 5 mins  Seated towel calf stretch 3x30 sec R  Seated black band tib posterior strengthening x20  Seated black band EV x20   SLR 2# 2x10  S/L hip abd 2# 2x10  SAQ 2# 2x10  Standing arch lifts x20  Incline gastroc stretch 3x30 sec R  Standing soleus stretch 3x30 sec R    Goals:  R knee pain Problems       R knee pain Problems (Active)       PT Problem       Increase R knee extension AROM to 0 degrees.       Start:  04/02/24    Expected End:  06/01/24       "      Increase length in B hamstrings by 5 degrees.       Start:  04/02/24    Expected End:  06/01/24            Increase strength in B hip and knee MMT by at least 1/3 grade.       Start:  04/02/24    Expected End:  06/01/24            Pt will maintain R SLS for at least 15 seconds on firm surface without LOB.       Start:  04/02/24    Expected End:  06/01/24            Pt will report at least 75% decrease in R knee pain to improve ADLs.       Start:  04/02/24    Expected End:  06/01/24                 R heel pain Problems       R heel pain Problems (Active)       PT Problem       Increase B tibialis posterior and soleus MMT to 5/5 and B FHL to at least 4+/5 to improve dynamic medial arch support with weight-bearing activities.       Start:  05/15/24    Expected End:  07/13/24            Increase B gastroc length to 8-10 degrees to reduce posterior forces on the ankle and foot contributing to stress on plantar fascia.       Start:  05/15/24    Expected End:  07/13/24            Pt will report at least 75% decrease in R heel/foot pain to improve walking and playing tennis.       Start:  05/15/24    Expected End:  07/13/24            Pt will demonstrate independence with HEP for proper self-management at home.       Start:  05/15/24    Expected End:  07/13/24

## 2024-07-22 ENCOUNTER — APPOINTMENT (OUTPATIENT)
Dept: PHYSICAL THERAPY | Facility: CLINIC | Age: 74
End: 2024-07-22
Payer: MEDICARE

## 2024-07-29 ENCOUNTER — TREATMENT (OUTPATIENT)
Dept: PHYSICAL THERAPY | Facility: CLINIC | Age: 74
End: 2024-07-29
Payer: MEDICARE

## 2024-07-29 DIAGNOSIS — M72.2 PLANTAR FASCIITIS: ICD-10-CM

## 2024-07-29 DIAGNOSIS — M79.671 RIGHT FOOT PAIN: Primary | ICD-10-CM

## 2024-07-29 PROCEDURE — 97140 MANUAL THERAPY 1/> REGIONS: CPT | Mod: GP | Performed by: PHYSICAL THERAPIST

## 2024-07-29 PROCEDURE — 97110 THERAPEUTIC EXERCISES: CPT | Mod: GP | Performed by: PHYSICAL THERAPIST

## 2024-07-29 NOTE — PROGRESS NOTES
"Physical Therapy    Physical Therapy Treatment    Patient Name: Raji Duggan  MRN: [id number]  Today's Date: 7/29/2024  Time Calculation  Start Time: 0900  Stop Time: 0945  Time Calculation (min): 45 min  PT Therapeutic Procedures Time Entry  Manual Therapy Time Entry: 15  Therapeutic Exercise Time Entry: 25  Insurance: Medicare  Certification: 5/14/24 - 8/12/24  PT visit limit: Medical necessity  Visit #6    Assessment:  Progresses to leg press without onset of knee symptoms. Shows strength improvement in tib posterior.    Plan:  Pt out of town for the next month and will follow up in early September.  Pt will need Medicare recert.      Problem List Items Addressed This Visit             ICD-10-CM    Right foot pain - Primary M79.671     Other Visit Diagnoses         Codes    Plantar fasciitis     M72.2            Subjective    \"Maybe a little improvement in the foot since last visit. I had my orthotics adjusted. I'm encouraged. I walked 18 holes yesterday and it felt good for 15 but wasn't bad for the last 3 holes.\"    Pain  R knee 0/10, R foot 0/10    Objective   R tibialis posterior MMT 5/5  R soleus MMT 4+/5    Treatments:  Dry Needling:  Pt informed of risks and consent obtained.  Pt prone. Skin and surrounding area cleaned and prepped with isopropyl alcohol wipe. 1- 1\" needle inserted into R heel at medial calcaneal tubercle. Total treatment time was 5 mins.     Manual Therapy:   IASTM to R plantar fascia  IASTM to R calf    Therapeutic Exercise:   Bike x 5 mins  Seated towel calf stretch 3x30 sec R  Seated black band tib posterior strengthening x20  Seated black band EV x20   SLR 2# 2x10  S/L hip abd 2# 2x10  Standing arch lifts x20  Leg press 70# 2x10  Incline gastroc stretch 3x30 sec R  Standing soleus stretch 3x30 sec R    Goals:  R knee pain Problems       R knee pain Problems (Active)       PT Problem       Increase R knee extension AROM to 0 degrees.       Start:  04/02/24    Expected End:  06/01/24    "         Increase length in B hamstrings by 5 degrees.       Start:  04/02/24    Expected End:  06/01/24            Increase strength in B hip and knee MMT by at least 1/3 grade.       Start:  04/02/24    Expected End:  06/01/24            Pt will maintain R SLS for at least 15 seconds on firm surface without LOB.       Start:  04/02/24    Expected End:  06/01/24            Pt will report at least 75% decrease in R knee pain to improve ADLs.       Start:  04/02/24    Expected End:  06/01/24                 R heel pain Problems       R heel pain Problems (Active)       PT Problem       Increase B tibialis posterior and soleus MMT to 5/5 and B FHL to at least 4+/5 to improve dynamic medial arch support with weight-bearing activities.       Start:  05/15/24    Expected End:  07/13/24            Increase B gastroc length to 8-10 degrees to reduce posterior forces on the ankle and foot contributing to stress on plantar fascia.       Start:  05/15/24    Expected End:  07/13/24            Pt will report at least 75% decrease in R heel/foot pain to improve walking and playing tennis.       Start:  05/15/24    Expected End:  07/13/24            Pt will demonstrate independence with HEP for proper self-management at home.       Start:  05/15/24    Expected End:  07/13/24

## 2024-08-28 ENCOUNTER — APPOINTMENT (OUTPATIENT)
Dept: UROLOGY | Facility: CLINIC | Age: 74
End: 2024-08-28
Payer: MEDICARE

## 2024-08-28 VITALS — WEIGHT: 181.3 LBS | TEMPERATURE: 96.8 F | BODY MASS INDEX: 25.38 KG/M2 | HEIGHT: 71 IN

## 2024-08-28 DIAGNOSIS — N40.1 BENIGN LOCALIZED PROSTATIC HYPERPLASIA WITH LOWER URINARY TRACT SYMPTOMS (LUTS): ICD-10-CM

## 2024-08-28 DIAGNOSIS — R93.89 ABNORMAL MRI: ICD-10-CM

## 2024-08-28 PROCEDURE — 1159F MED LIST DOCD IN RCRD: CPT | Performed by: UROLOGY

## 2024-08-28 PROCEDURE — G2211 COMPLEX E/M VISIT ADD ON: HCPCS | Performed by: UROLOGY

## 2024-08-28 PROCEDURE — 99213 OFFICE O/P EST LOW 20 MIN: CPT | Performed by: UROLOGY

## 2024-08-28 PROCEDURE — 3008F BODY MASS INDEX DOCD: CPT | Performed by: UROLOGY

## 2024-08-28 PROCEDURE — 51798 US URINE CAPACITY MEASURE: CPT | Performed by: UROLOGY

## 2024-08-28 PROCEDURE — 1036F TOBACCO NON-USER: CPT | Performed by: UROLOGY

## 2024-08-28 PROCEDURE — 1126F AMNT PAIN NOTED NONE PRSNT: CPT | Performed by: UROLOGY

## 2024-08-28 ASSESSMENT — PAIN SCALES - GENERAL: PAINLEVEL: 0-NO PAIN

## 2024-08-28 NOTE — PROGRESS NOTES
Raji Duggan is a 73 y.o. male with history of BPH s/p HoLEP on 5/23/2024 who presents for 3 month post-op visit. Patient is doing well since the procedure. His urinary stream is excellent. He is able to empty his bladder well. He is able to sleep through the night. Patient is overall satisfied with his urinary outcome s/p HoLEP.    Path: 36.4 g of benign tissue removed.         IPSS: 3 and 0  PVR: 52ml      Current Outpatient Medications on File Prior to Visit   Medication Sig Dispense Refill    acetaminophen (Tylenol Extra Strength) 500 mg tablet Take 1 tablet (500 mg) by mouth every 6 hours if needed.      atorvastatin (Lipitor) 20 mg tablet Take 1 tablet (20 mg) by mouth once daily. (Patient taking differently: Take 1 tablet (20 mg) by mouth once daily at bedtime.) 90 tablet 3    glucosamine-chondroitin 500-400 mg tablet Take 1 tablet by mouth 2 times a day.      losartan (Cozaar) 50 mg tablet Take 1.5 tablets (75 mg) by mouth once daily. 45 tablet 11    multivitamin tablet Take 1 tablet by mouth once daily.      omeprazole (PriLOSEC) 40 mg DR capsule Take 1 capsule (40 mg) by mouth once daily in the morning. 90 capsule 0     No current facility-administered medications on file prior to visit.     Lab Results   Component Value Date    PSA 1.4 12/01/2023    PSA 1.6 11/29/2022    PSA 1.6 11/18/2021           Plan:    BPH with LUTS s/p HoLEP 5/23/2024     Patient has had resolution of urinary symptoms. He is overall satisfied.     We will continue to monitor.    PI-RADS 3 on pre-op MRI    We will repeat prostate MRI and PSA in 1 month.    Follow up virtually to review.     All questions were answered to the patient's satisfaction. Patient agrees with the plan and wishes to proceed. Follow-up will be scheduled appropriately.     E&M visit today is associated with current or anticipated ongoing medical care services related to a patient's single, serious condition or a complex condition.    Scribed for Dr. Pineda by  Loreta Padilla I , Dr Pineda, have personally reviewed and agreed with the information entered by the Virtual Scribe.

## 2024-08-29 ENCOUNTER — APPOINTMENT (OUTPATIENT)
Dept: PHYSICAL THERAPY | Facility: CLINIC | Age: 74
End: 2024-08-29
Payer: MEDICARE

## 2024-09-12 ENCOUNTER — TREATMENT (OUTPATIENT)
Dept: PHYSICAL THERAPY | Facility: CLINIC | Age: 74
End: 2024-09-12
Payer: MEDICARE

## 2024-09-12 DIAGNOSIS — M79.671 RIGHT FOOT PAIN: Primary | ICD-10-CM

## 2024-09-12 DIAGNOSIS — M72.2 PLANTAR FASCIITIS: ICD-10-CM

## 2024-09-12 PROCEDURE — 97140 MANUAL THERAPY 1/> REGIONS: CPT | Mod: GP | Performed by: PHYSICAL THERAPIST

## 2024-09-12 PROCEDURE — 97110 THERAPEUTIC EXERCISES: CPT | Mod: GP | Performed by: PHYSICAL THERAPIST

## 2024-09-12 NOTE — PROGRESS NOTES
"Physical Therapy    Physical Therapy Treatment    Patient Name: Raji Duggan  MRN: [id number]  Today's Date: 9/12/2024  Time Calculation  Start Time: 1000  Stop Time: 1050  Time Calculation (min): 50 min  PT Therapeutic Procedures Time Entry  Manual Therapy Time Entry: 15  Therapeutic Exercise Time Entry: 25  Insurance: Medicare  Certification: 5/14/24 - 8/12/24  PT visit limit: Medical necessity  Visit #7    Assessment:  Reassess shows some improvement in gastroc length and tibialis posterior strength since beginning PT. Pt would benefit from continuing skilled PT to achieve all PT goals.    Plan:  Follow up in 2 weeks. Toe yoga exercises.    Problem List Items Addressed This Visit             ICD-10-CM    Right foot pain - Primary M79.671     Other Visit Diagnoses         Codes    Plantar fasciitis     M72.2            Subjective    \"I had a bit of a setback. My knee gave out on me the other day while playing tennis. Yesterday was pretty rough but it's better this morning. I put on a brace which seems to help. My foot still bothers me after walking after a while. It's not debilitating, just aggravating.\" Having orthotics adjusted again.    Pain  R knee 3/10, R foot 2/10    Objective   Palpation: TTP to R medial calcaneal tubercle, flexor hallucis longus     Observation:  B pes cavus     Gait: non-antalgic     Single leg balance: 30 seconds bilat     Single calf raise (R/L): 3.25” / 3”      Ankle AROM symmetrical, WNL and pain-free     Ankle/Foot MMT (R/L):   Tibialis anterior- 5/5; 5/5  Tibialis posterior- 5/5; 5/5  Peroneals- 5/5; 5/5  Soleus- 4+/5; 4+/5  Gastrocs- 5/5; 5/5  EHL- 5/5; 5/5  FHL- 4/5; 4/5     Length tests (R/L in degrees):   Gastrocs- 6-7 / 6-7     Outcome Measures:  Lower Extremity Functional Scale: 65/80    Treatments:  Dry Needling:  Pt informed of risks and consent obtained.  Pt prone. Skin and surrounding area cleaned and prepped with isopropyl alcohol wipe. 1- 1\" needle inserted into R heel at " medial calcaneal tubercle. Total treatment time was 5 mins.     Manual Therapy:   IASTM to R plantar fascia  IASTM to R calf    Therapeutic Exercise:   Seated towel calf stretch 3x30 sec R  Seated black band tib posterior strengthening x20  Seated black band EV x20   Standing arch lifts x20  Leg press 70# 2x10  Incline gastroc stretch 3x30 sec R  Standing soleus stretch 3x30 sec R    Goals:  Active       PT Problem       Increase B tibialis posterior and soleus MMT to 5/5 and B FHL to at least 4+/5 to improve dynamic medial arch support with weight-bearing activities.       Start:  05/15/24    Expected End:  07/13/24            Increase B gastroc length to 8-10 degrees to reduce posterior forces on the ankle and foot contributing to stress on plantar fascia.       Start:  05/15/24    Expected End:  07/13/24            Pt will report at least 75% decrease in R heel/foot pain to improve walking and playing tennis.       Start:  05/15/24    Expected End:  07/13/24            Pt will demonstrate independence with HEP for proper self-management at home.       Start:  05/15/24    Expected End:  07/13/24

## 2024-09-26 ENCOUNTER — HOSPITAL ENCOUNTER (OUTPATIENT)
Dept: RADIOLOGY | Facility: HOSPITAL | Age: 74
Discharge: HOME | End: 2024-09-26
Payer: MEDICARE

## 2024-09-26 ENCOUNTER — LAB (OUTPATIENT)
Dept: LAB | Facility: LAB | Age: 74
End: 2024-09-26
Payer: MEDICARE

## 2024-09-26 VITALS — WEIGHT: 180 LBS | BODY MASS INDEX: 25.1 KG/M2

## 2024-09-26 DIAGNOSIS — R93.89 ABNORMAL MRI: ICD-10-CM

## 2024-09-26 DIAGNOSIS — N40.1 BENIGN LOCALIZED PROSTATIC HYPERPLASIA WITH LOWER URINARY TRACT SYMPTOMS (LUTS): ICD-10-CM

## 2024-09-26 LAB — PSA SERPL-MCNC: 0.21 NG/ML

## 2024-09-26 PROCEDURE — A9575 INJ GADOTERATE MEGLUMI 0.1ML: HCPCS | Performed by: UROLOGY

## 2024-09-26 PROCEDURE — 84153 ASSAY OF PSA TOTAL: CPT

## 2024-09-26 PROCEDURE — 2550000001 HC RX 255 CONTRASTS: Performed by: UROLOGY

## 2024-09-26 PROCEDURE — 72197 MRI PELVIS W/O & W/DYE: CPT

## 2024-09-26 PROCEDURE — 36415 COLL VENOUS BLD VENIPUNCTURE: CPT

## 2024-09-26 RX ORDER — GADOTERATE MEGLUMINE 376.9 MG/ML
16 INJECTION INTRAVENOUS
Status: COMPLETED | OUTPATIENT
Start: 2024-09-26 | End: 2024-09-26

## 2024-10-01 ENCOUNTER — TREATMENT (OUTPATIENT)
Dept: PHYSICAL THERAPY | Facility: CLINIC | Age: 74
End: 2024-10-01
Payer: MEDICARE

## 2024-10-01 DIAGNOSIS — M79.671 RIGHT FOOT PAIN: Primary | ICD-10-CM

## 2024-10-01 DIAGNOSIS — M72.2 PLANTAR FASCIITIS: ICD-10-CM

## 2024-10-01 PROCEDURE — 97140 MANUAL THERAPY 1/> REGIONS: CPT | Mod: GP | Performed by: PHYSICAL THERAPIST

## 2024-10-01 PROCEDURE — 97110 THERAPEUTIC EXERCISES: CPT | Mod: GP | Performed by: PHYSICAL THERAPIST

## 2024-10-01 NOTE — PROGRESS NOTES
"Physical Therapy    Physical Therapy Treatment    Patient Name: Raji Duggan  MRN: [id number]  Today's Date: 10/1/2024  Time Calculation  Start Time: 1035  Stop Time: 1120  Time Calculation (min): 45 min  PT Therapeutic Procedures Time Entry  Manual Therapy Time Entry: 10  Therapeutic Exercise Time Entry: 30  Insurance: Medicare  Certification: 5/14/24 - 8/12/24  Recert: 9/12/24 - 12/11/24  PT visit limit: Medical necessity  Visit #8    Assessment:  Toe exercises challenging. Able to progress to soleus stretching on incline on R but not L.    Plan:  Continue calf stretching, foot intrinsic strengthening.    Problem List Items Addressed This Visit             ICD-10-CM    Right foot pain - Primary M79.671     Other Visit Diagnoses         Codes    Plantar fasciitis     M72.2            Subjective    \"Feeling pretty good since last time I was here. I think it's heading in the right direction. I feel like I can walk longer without it bothering me. I have to watch that I don't overdo it.\"    Pain  R knee 1/10, R heel 1.5-2/10    Objective   Poor control of abductor hallucis during great toe abduction exercise.    Treatments:  Manual Therapy:   IASTM to R plantar fascia  IASTM to R calf    Therapeutic Exercise:   Seated towel calf stretch 3x30 sec R  Seated black band tib posterior strengthening x20  Seated black band EV x20   Standing arch lifts x20  Seated great toe ext x20  Seated toe abd/add (few reps, difficulty isolating)  Seated towel scrunches x20  Leg press 80# 2x10  Incline gastroc stretch 3x30 sec ea leg  Incline soleus stretch 3x30 sec R (L performed on floor)    Goals:  Active       PT Problem       Increase B tibialis posterior and soleus MMT to 5/5 and B FHL to at least 4+/5 to improve dynamic medial arch support with weight-bearing activities.       Start:  05/15/24    Expected End:  07/13/24            Increase B gastroc length to 8-10 degrees to reduce posterior forces on the ankle and foot contributing " to stress on plantar fascia.       Start:  05/15/24    Expected End:  07/13/24            Pt will report at least 75% decrease in R heel/foot pain to improve walking and playing tennis.       Start:  05/15/24    Expected End:  07/13/24            Pt will demonstrate independence with HEP for proper self-management at home.       Start:  05/15/24    Expected End:  07/13/24

## 2024-10-02 ENCOUNTER — APPOINTMENT (OUTPATIENT)
Dept: UROLOGY | Facility: CLINIC | Age: 74
End: 2024-10-02
Payer: MEDICARE

## 2024-10-02 DIAGNOSIS — N40.1 BENIGN LOCALIZED PROSTATIC HYPERPLASIA WITH LOWER URINARY TRACT SYMPTOMS (LUTS): Primary | ICD-10-CM

## 2024-10-02 PROCEDURE — G2211 COMPLEX E/M VISIT ADD ON: HCPCS | Performed by: UROLOGY

## 2024-10-02 PROCEDURE — 99213 OFFICE O/P EST LOW 20 MIN: CPT | Performed by: UROLOGY

## 2024-10-02 NOTE — PROGRESS NOTES
Subjective     This visit was completed via telemedicine. All issues as below were discussed and addressed but no physical exam was performed unless allowed by visual confirmation. If it was felt that the patient should be evaluated in clinic, then they were directed there. Patient verbally consented to visit.      Raji Duggan is a 74 y.o. male with history of BPH s/p HoLEP on 5/23/2024  and PI-RADS 3 on pre-op MRI. Presents today for a follow up visit to review PSA. Patient has no new complaints.         Past Medical History:   Diagnosis Date    Benign prostatic hyperplasia with lower urinary tract symptoms     GERD (gastroesophageal reflux disease)     Hyperlipidemia     Hypertension     Inguinal hernia     Knee pain     chronic right knee    Plantar fasciitis      Past Surgical History:   Procedure Laterality Date    COLONOSCOPY  07/01/2016    Complete Colonoscopy    FOOT SURGERY  03/07/2014    Foot Repair    OTHER SURGICAL HISTORY  03/07/2014    Primary Repair Of Ruptured Achilles Tendon    SHOULDER SURGERY  03/07/2014    Shoulder Surgery    TONSILLECTOMY  03/07/2014    Tonsillectomy     No family history on file.  Current Outpatient Medications   Medication Sig Dispense Refill    acetaminophen (Tylenol Extra Strength) 500 mg tablet Take 1 tablet (500 mg) by mouth every 6 hours if needed.      atorvastatin (Lipitor) 20 mg tablet Take 1 tablet (20 mg) by mouth once daily. (Patient taking differently: Take 1 tablet (20 mg) by mouth once daily at bedtime.) 90 tablet 3    glucosamine-chondroitin 500-400 mg tablet Take 1 tablet by mouth 2 times a day.      losartan (Cozaar) 50 mg tablet Take 1.5 tablets (75 mg) by mouth once daily. 45 tablet 11    multivitamin tablet Take 1 tablet by mouth once daily.      omeprazole (PriLOSEC) 40 mg DR capsule Take 1 capsule (40 mg) by mouth once daily in the morning. 90 capsule 0     No current facility-administered medications for this visit.     No Known Allergies  Social  History     Socioeconomic History    Marital status:      Spouse name: Not on file    Number of children: Not on file    Years of education: Not on file    Highest education level: Not on file   Occupational History    Not on file   Tobacco Use    Smoking status: Never     Passive exposure: Never    Smokeless tobacco: Never   Vaping Use    Vaping status: Never Used   Substance and Sexual Activity    Alcohol use: Yes     Comment: 2-3 glasses of wine daily    Drug use: Never    Sexual activity: Not on file   Other Topics Concern    Not on file   Social History Narrative    Not on file     Social Determinants of Health     Financial Resource Strain: Low Risk  (5/15/2024)    Received from [facility]    Overall Financial Resource Strain (CARDIA)     Difficulty of Paying Living Expenses: Not hard at all   Food Insecurity: No Food Insecurity (5/15/2024)    Received from [facility]    Hunger Vital Sign     Worried About Running Out of Food in the Last Year: Never true     Ran Out of Food in the Last Year: Never true   Transportation Needs: No Transportation Needs (5/15/2024)    Received from [facility]    PRAPARE - Transportation     Lack of Transportation (Medical): No     Lack of Transportation (Non-Medical): No   Physical Activity: Sufficiently Active (5/15/2024)    Received from [facility]    Exercise Vital Sign     Days of Exercise per Week: 3 days     Minutes of Exercise per Session: 70 min   Stress: No Stress Concern Present (5/15/2024)    Received from [facility]    [place] [employer] of [employer] - Occupational Stress Questionnaire     Feeling of Stress : Not at all   Social Connections: Moderately Isolated (5/15/2024)    Received from [facility]    Social Connection and Isolation Panel [NHANES]     Frequency of Communication with Friends and Family: Three times a week     Frequency of Social Gatherings with Friends  and Family: Three times a week     Attends Protestant Services: Never     Active Member of Clubs or Organizations: No     Attends Club or Organization Meetings: Never     Marital Status:    Intimate Partner Violence: Not At Risk (5/15/2024)    Received from [facility]    Humiliation, Afraid, Rape, and Kick questionnaire     Fear of Current or Ex-Partner: No     Emotionally Abused: No     Physically Abused: No     Sexually Abused: No   Housing Stability: Unknown (5/15/2024)    Received from [facility]    Housing Stability Vital Sign     Unable to Pay for Housing in the Last Year: No     Number of Places Lived in the Last Year: Not on file     Unstable Housing in the Last Year: No       Review of Systems  Pertinent items are noted in HPI.    Objective       Lab Review  Lab Results   Component Value Date    WBC 7.5 05/13/2024    RBC 4.44 (L) 05/13/2024    HGB 13.5 05/13/2024    HCT 39.7 (L) 05/13/2024     05/13/2024      Lab Results   Component Value Date    BUN 15 05/13/2024    CREATININE 0.94 05/13/2024      Lab Results   Component Value Date    PSA 0.21 09/26/2024           Assessment/Plan   There are no diagnoses linked to this encounter.  1. BPH with LUTS s/p HoLEP 5/23/2024      Patient has had resolution of urinary symptoms. He is overall satisfied.      We will continue to monitor.     2. PI-RADS 3 on pre-op MRI    PSA is 0.21 (9/26/2024), stable.  MRI negative    We will follow up in 1 year with PSA prior.       All questions were answered to the patient's satisfaction. Patient agrees with the plan and wishes to proceed. Follow-up will be scheduled appropriately.     E&M visit today is associated with current or anticipated ongoing medical care services related to a patient's single, serious condition or a complex condition.      Scribed for Dr. Pineda by Loreta Berumen. I , Dr iPneda, have personally reviewed and agreed with the information entered by the Virtual Scribe.

## 2024-10-15 ENCOUNTER — TREATMENT (OUTPATIENT)
Dept: PHYSICAL THERAPY | Facility: CLINIC | Age: 74
End: 2024-10-15
Payer: MEDICARE

## 2024-10-15 DIAGNOSIS — M72.2 PLANTAR FASCIITIS: ICD-10-CM

## 2024-10-15 DIAGNOSIS — M79.671 RIGHT FOOT PAIN: Primary | ICD-10-CM

## 2024-10-15 PROCEDURE — 97140 MANUAL THERAPY 1/> REGIONS: CPT | Mod: GP | Performed by: PHYSICAL THERAPIST

## 2024-10-15 PROCEDURE — 97110 THERAPEUTIC EXERCISES: CPT | Mod: GP | Performed by: PHYSICAL THERAPIST

## 2024-10-15 NOTE — PROGRESS NOTES
"Physical Therapy    Physical Therapy Treatment    Patient Name: Raji Duggan  MRN: [id number]  Today's Date: 10/15/2024  Time Calculation  Start Time: 0945  Stop Time: 1035  Time Calculation (min): 50 min  PT Therapeutic Procedures Time Entry  Manual Therapy Time Entry: 10  Therapeutic Exercise Time Entry: 30  Insurance: Medicare  Certification: 5/14/24 - 8/12/24  Recert: 9/12/24 - 12/11/24  PT visit limit: Medical necessity  Visit #9    Assessment:  Pt shows good recall of all exercises today.    Plan:  Continue in few weeks. Plan to discharge to [facility] if continuing to do well.    Problem List Items Addressed This Visit             ICD-10-CM    Right foot pain - Primary M79.671     Other Visit Diagnoses         Codes    Plantar fasciitis     M72.2            Subjective    \"Steady as it goes, seems a little better. I'm still able to do as much activity as I want. I noticed yesterday playing my R foot would swell. I iced it and it went back to normal.\"    Pain  R knee 1-2/10, R heel 1-2/10    Objective   Treatments:  Manual Therapy:   IASTM to R plantar fascia  IASTM to R calf    Therapeutic Exercise:   Upright bike x 5 mins  Seated towel calf stretch 3x30 sec R  Seated black band tib posterior strengthening x20  Seated black band EV x20   Standing arch lifts x20  Seated great toe ext x20  Seated towel scrunches x20  Leg press 80# 3x10  Incline gastroc stretch 3x30 sec ea leg  Incline soleus stretch 3x30 sec R (L performed on floor)    Goals:  Active       PT Problem       Increase B tibialis posterior and soleus MMT to 5/5 and B FHL to at least 4+/5 to improve dynamic medial arch support with weight-bearing activities.       Start:  05/15/24    Expected End:  07/13/24            Increase B gastroc length to 8-10 degrees to reduce posterior forces on the ankle and foot contributing to stress on plantar fascia.       Start:  05/15/24    Expected End:  07/13/24            Pt will report at least 75% decrease in " R heel/foot pain to improve walking and playing tennis.       Start:  05/15/24    Expected End:  07/13/24            Pt will demonstrate independence with HEP for proper self-management at home.       Start:  05/15/24    Expected End:  07/13/24

## 2024-11-01 ENCOUNTER — APPOINTMENT (OUTPATIENT)
Dept: PHYSICAL THERAPY | Facility: CLINIC | Age: 74
End: 2024-11-01
Payer: MEDICARE

## 2024-12-19 ENCOUNTER — DOCUMENTATION (OUTPATIENT)
Dept: PHYSICAL THERAPY | Facility: CLINIC | Age: 74
End: 2024-12-19
Payer: MEDICARE

## 2024-12-19 NOTE — PROGRESS NOTES
Physical Therapy    Discharge Summary    Name: Raji Duggan  MRN: [id number]  : 1950  Date: 24    Discharge Summary: PT    Discharge Information: Date of discharge 24, Date of last visit 10/15/24, Date of evaluation 24, Number of attended visits 9, Referred by Dr. Ordonez, and Referred for R foot pain    Therapy Summary: Pt consisted of manual therapy, therapeutic exercise and dry needling.    Discharge Status: Unknown. Plan was to discharge pt at next visit, however pt did not return to the clinic.     Rehab Discharge Reason: Failed to schedule and/or keep follow-up appointment(s)

## 2025-04-02 ENCOUNTER — HOSPITAL ENCOUNTER (OUTPATIENT)
Dept: RADIOLOGY | Facility: HOSPITAL | Age: 75
Discharge: HOME | End: 2025-04-02
Payer: MEDICARE

## 2025-04-02 ENCOUNTER — APPOINTMENT (OUTPATIENT)
Dept: UROLOGY | Facility: CLINIC | Age: 75
End: 2025-04-02
Payer: MEDICARE

## 2025-04-02 VITALS — TEMPERATURE: 98.6 F

## 2025-04-02 DIAGNOSIS — R19.09 INGUINAL BULGE: ICD-10-CM

## 2025-04-02 DIAGNOSIS — R19.09 INGUINAL BULGE: Primary | ICD-10-CM

## 2025-04-02 PROCEDURE — G2211 COMPLEX E/M VISIT ADD ON: HCPCS | Performed by: UROLOGY

## 2025-04-02 PROCEDURE — 74176 CT ABD & PELVIS W/O CONTRAST: CPT | Performed by: RADIOLOGY

## 2025-04-02 PROCEDURE — 1126F AMNT PAIN NOTED NONE PRSNT: CPT | Performed by: UROLOGY

## 2025-04-02 PROCEDURE — 1159F MED LIST DOCD IN RCRD: CPT | Performed by: UROLOGY

## 2025-04-02 PROCEDURE — 74176 CT ABD & PELVIS W/O CONTRAST: CPT

## 2025-04-02 PROCEDURE — 1036F TOBACCO NON-USER: CPT | Performed by: UROLOGY

## 2025-04-02 PROCEDURE — 99213 OFFICE O/P EST LOW 20 MIN: CPT | Performed by: UROLOGY

## 2025-04-02 ASSESSMENT — PAIN SCALES - GENERAL: PAINLEVEL_OUTOF10: 0-NO PAIN

## 2025-04-02 NOTE — PROGRESS NOTES
Subjective       Raji Duggan is a 74 y.o. male with history of BPH s/p HoLEP on 5/23/2024  and PI-RADS 3 on pre-op MRI. Presents today for a follow up visit. Patient has no bothersome urinary symptoms. Patient reports he noticed a left inguinal bulge that comes and goes. He had a bilateral hernia repair 1 year ago. Denies any recent gross hematuria, fevers, chills, urinary retention, intractable flank or abdominal pain, nausea or vomiting.            Past Medical History:   Diagnosis Date    Benign prostatic hyperplasia with lower urinary tract symptoms     GERD (gastroesophageal reflux disease)     Hyperlipidemia     Hypertension     Inguinal hernia     Knee pain     chronic right knee    Plantar fasciitis      Past Surgical History:   Procedure Laterality Date    COLONOSCOPY  07/01/2016    Complete Colonoscopy    FOOT SURGERY  03/07/2014    Foot Repair    OTHER SURGICAL HISTORY  03/07/2014    Primary Repair Of Ruptured Achilles Tendon    SHOULDER SURGERY  03/07/2014    Shoulder Surgery    TONSILLECTOMY  03/07/2014    Tonsillectomy     No family history on file.  Current Outpatient Medications   Medication Sig Dispense Refill    acetaminophen (Tylenol Extra Strength) 500 mg tablet Take 1 tablet (500 mg) by mouth every 6 hours if needed.      atorvastatin (Lipitor) 20 mg tablet Take 1 tablet (20 mg) by mouth once daily. (Patient taking differently: Take 1 tablet (20 mg) by mouth once daily at bedtime.) 90 tablet 3    glucosamine-chondroitin 500-400 mg tablet Take 1 tablet by mouth 2 times a day.      losartan (Cozaar) 50 mg tablet Take 1.5 tablets (75 mg) by mouth once daily. 45 tablet 11    multivitamin tablet Take 1 tablet by mouth once daily.      omeprazole (PriLOSEC) 40 mg DR capsule Take 1 capsule (40 mg) by mouth once daily in the morning. 90 capsule 0     No current facility-administered medications for this visit.     No Known Allergies  Social History     Socioeconomic History    Marital status:       Spouse name: Not on file    Number of children: Not on file    Years of education: Not on file    Highest education level: Not on file   Occupational History    Not on file   Tobacco Use    Smoking status: Never     Passive exposure: Never    Smokeless tobacco: Never   Vaping Use    Vaping status: Never Used   Substance and Sexual Activity    Alcohol use: Yes     Comment: 2-3 glasses of wine daily    Drug use: Never    Sexual activity: Not on file   Other Topics Concern    Not on file   Social History Narrative    Not on file     Social Drivers of Health     Financial Resource Strain: Low Risk  (5/15/2024)    Received from [facility]    Overall Financial Resource Strain (CARDIA)     Difficulty of Paying Living Expenses: Not hard at all   Food Insecurity: No Food Insecurity (5/15/2024)    Received from [facility]    Hunger Vital Sign     Worried About Running Out of Food in the Last Year: Never true     Ran Out of Food in the Last Year: Never true   Transportation Needs: No Transportation Needs (5/15/2024)    Received from [facility]    PRAPARE - Transportation     Lack of Transportation (Medical): No     Lack of Transportation (Non-Medical): No   Physical Activity: Sufficiently Active (5/15/2024)    Received from [facility]    Exercise Vital Sign     Days of Exercise per Week: 3 days     Minutes of Exercise per Session: 70 min   Stress: No Stress Concern Present (5/15/2024)    Received from [facility]    [place] of Occupational Health - Occupational Stress Questionnaire     Feeling of Stress : Not at all   Social Connections: Moderately Isolated (5/15/2024)    Received from [facility]    Social Connection and Isolation Panel [NHANES]     Frequency of Communication with Friends and Family: Three times a week     Frequency of Social Gatherings with Friends and Family: Three times a week     Attends Moravian Services:  Never     Active Member of Clubs or Organizations: No     Attends Club or Organization Meetings: Never     Marital Status:    Intimate Partner Violence: Not At Risk (5/15/2024)    Received from [facility]    Humiliation, Afraid, Rape, and Kick questionnaire     Fear of Current or Ex-Partner: No     Emotionally Abused: No     Physically Abused: No     Sexually Abused: No   Housing Stability: Unknown (5/15/2024)    [email or web address]    Housing Stability Vital Sign     Unable to Pay for Housing in the Last Year: No     Number of Places Lived in the Last Year: Not on file     Unstable Housing in the Last Year: No       Review of Systems  Pertinent items are noted in HPI.    Objective       Lab Review  Lab Results   Component Value Date    WBC 7.5 05/13/2024    RBC 4.44 (L) 05/13/2024    HGB 13.5 05/13/2024    HCT 39.7 (L) 05/13/2024     05/13/2024      Lab Results   Component Value Date    BUN 15 05/13/2024    CREATININE 0.94 05/13/2024      Lab Results   Component Value Date    PSA 0.21 09/26/2024       Assessment/Plan   Diagnoses and all orders for this visit:  Inguinal bulge  -     CT abdomen pelvis wo IV contrast; Future      BPH with LUTS s/p HoLEP 5/23/2024     Patient has no bothersome urinary symptoms.      We will continue to monitor.    Left Inguinal Bulge     We will obtain a CT A&P and follow up virtually to review.      All questions were answered to the patient's satisfaction. Patient agrees with the plan and wishes to proceed. Follow-up will be scheduled appropriately.     E&M visit today is associated with current or anticipated ongoing medical care services related to a patient's single, serious condition or a complex condition.      Scribed for Dr. Pineda by Loreta Berumen. I , Dr Pineda, have personally reviewed and agreed with the information entered by the Virtual Scribe.

## 2025-04-04 ENCOUNTER — TELEMEDICINE (OUTPATIENT)
Dept: UROLOGY | Facility: CLINIC | Age: 75
End: 2025-04-04
Payer: MEDICARE

## 2025-04-04 DIAGNOSIS — N43.40 SPERMATOCELE: Primary | ICD-10-CM

## 2025-04-04 DIAGNOSIS — N50.3 CYST OF EPIDIDYMIS: ICD-10-CM

## 2025-04-04 PROCEDURE — G2211 COMPLEX E/M VISIT ADD ON: HCPCS | Performed by: UROLOGY

## 2025-04-04 PROCEDURE — 99213 OFFICE O/P EST LOW 20 MIN: CPT | Performed by: UROLOGY

## 2025-04-04 NOTE — PROGRESS NOTES
"Scribed for Dr. Dinorah Pineda by Jordan Fritz. I, Dr. Dinorah Pineda, have personally reviewed and agreed with the information entered by the Virtual Scribe. This visit was completed via telemedicine. All issues as below were discussed and addressed but no physical exam was performed unless allowed by visual confirmation. If it was felt that the patient should be evaluated in clinic, then they were directed there. Patient verbal consented to the visit. 04/03/25    History of Present Illness:  TODAY: (04/03/25)  Raji Duggan is a 74 y.o. male with history of nephrolithiasis, and BPH s/p HoLEP  (05/23/2024); 36.4g benign tissue removed. Last visit, reported no bothersome urinary symptoms but noted a left inguinal \"bulge.\" Concerned he may have a recurrent hernia, given his hx of undergoing bilateral hernia repair about 1 year ago. Presents for virtual follow up and CT results.     CT A&P (04/02/25) personally reviewed and independently interpreted.  1. The bulge in the left side of the inguinal region is likely a large epididymal head cyst or spermatocele. This could be confirmed with scrotal ultrasound.  2. No inguinal hernia.  3. Bilateral non-obstructing intrarenal calculi.  4. Left parapelvic cysts.    Past Medical History:   Diagnosis Date    Benign prostatic hyperplasia with lower urinary tract symptoms     GERD (gastroesophageal reflux disease)     Hyperlipidemia     Hypertension     Inguinal hernia     Knee pain     chronic right knee    Plantar fasciitis      Past Surgical History:   Procedure Laterality Date    COLONOSCOPY  07/01/2016    Complete Colonoscopy    FOOT SURGERY  03/07/2014    Foot Repair    OTHER SURGICAL HISTORY  03/07/2014    Primary Repair Of Ruptured Achilles Tendon    SHOULDER SURGERY  03/07/2014    Shoulder Surgery    TONSILLECTOMY  03/07/2014    Tonsillectomy     No family history on file.  Social History     Tobacco Use   Smoking Status Never    Passive exposure: Never   Smokeless Tobacco " Never     Current Outpatient Medications   Medication Sig Dispense Refill    acetaminophen (Tylenol Extra Strength) 500 mg tablet Take 1 tablet (500 mg) by mouth every 6 hours if needed.      atorvastatin (Lipitor) 20 mg tablet Take 1 tablet (20 mg) by mouth once daily. (Patient taking differently: Take 1 tablet (20 mg) by mouth once daily at bedtime.) 90 tablet 3    glucosamine-chondroitin 500-400 mg tablet Take 1 tablet by mouth 2 times a day.      losartan (Cozaar) 50 mg tablet Take 1.5 tablets (75 mg) by mouth once daily. 45 tablet 11    multivitamin tablet Take 1 tablet by mouth once daily.      omeprazole (PriLOSEC) 40 mg DR capsule Take 1 capsule (40 mg) by mouth once daily in the morning. 90 capsule 0     No current facility-administered medications for this visit.     No Known Allergies  Past medical, surgical, family and social history in the chart was reviewed and is accurate including any additions to what is in this HPI.    Review of systems:   Pertinent information as listed in the HPI.        Objective   There were no vitals taken for this visit.  Physical Exam:  Exam limited 2/2 being a telehealth visit.     Lab Review:  Lab Results   Component Value Date    WBC 7.5 05/13/2024    RBC 4.44 (L) 05/13/2024    HGB 13.5 05/13/2024    HCT 39.7 (L) 05/13/2024     05/13/2024      Lab Results   Component Value Date    BUN 15 05/13/2024    CREATININE 0.94 05/13/2024      Lab Results   Component Value Date    PSA 0.21 09/26/2024    HGBA1C 5.2 12/01/2023     Lab Results   Component Value Date    CHOL 226 (H) 12/01/2023    TRIG 115 12/01/2023    HDL 67.8 12/01/2023    ALT 29 12/01/2023    AST 28 12/01/2023     05/13/2024    K 4.6 05/13/2024     (H) 05/13/2024    CO2 27 05/13/2024    INR 1.1 05/13/2024        ASSESSMENT:  Problem List Items Addressed This Visit    None     PLAN:  #BPH with LUTS ~ s/p HoLEP 5/23/2024   At times he feels a constricting sensation of his bladder. He feels this may  be due to his epididymal cyst versus spermatocele. Discussed that this is unlikely, but may be due to a bladder versus channel issue/pathology. If persistent or worsening symptoms may consider cystoscopy to assess lower tract anatomy.   Otherwise, he remains satisfied with results of his HoLEP.   We will continue to monitor.    #Epididymal head cyst versus spermatocele.  His epididymal cyst/spermatocele is considered benign and not concerning. Provided reassurance. Patient elects to repeat a scrotal ultrasound in 6 months for surveillance. Schedule follow up appropriately to review results.     All questions were answered to the patient’s satisfaction.  Patient agrees with the plan and wishes to proceed.  Continue follow-up for ongoing care of his chronic medical conditions.    Scribed for Dr. Dinorah Pineda by Jordan Fritz.  I, Dr. Dinorah Pineda, have personally reviewed and agreed with the information entered by the Virtual Scribe. 04/03/25

## 2025-04-08 ENCOUNTER — OFFICE VISIT (OUTPATIENT)
Dept: URGENT CARE | Age: 75
End: 2025-04-08
Payer: MEDICARE

## 2025-04-08 VITALS
BODY MASS INDEX: 25.2 KG/M2 | DIASTOLIC BLOOD PRESSURE: 79 MMHG | HEART RATE: 50 BPM | SYSTOLIC BLOOD PRESSURE: 132 MMHG | OXYGEN SATURATION: 99 % | HEIGHT: 71 IN | RESPIRATION RATE: 16 BRPM | WEIGHT: 180 LBS | TEMPERATURE: 97.8 F

## 2025-04-08 DIAGNOSIS — J02.9 SORE THROAT: Primary | ICD-10-CM

## 2025-04-08 DIAGNOSIS — J01.10 SUBACUTE FRONTAL SINUSITIS: ICD-10-CM

## 2025-04-08 LAB
POC HUMAN RHINOVIRUS PCR: NEGATIVE
POC INFLUENZA A VIRUS PCR: NEGATIVE
POC INFLUENZA B VIRUS PCR: NEGATIVE
POC RESPIRATORY SYNCYTIAL VIRUS PCR: NEGATIVE
POC STREPTOCOCCUS PYOGENES (GROUP A STREP) PCR: NEGATIVE

## 2025-04-08 RX ORDER — GUAIFENESIN 1200 MG/1
1200 TABLET, EXTENDED RELEASE ORAL EVERY 12 HOURS PRN
Qty: 14 TABLET | Refills: 0 | Status: SHIPPED | OUTPATIENT
Start: 2025-04-08 | End: 2025-04-15

## 2025-04-08 RX ORDER — AMOXICILLIN AND CLAVULANATE POTASSIUM 875; 125 MG/1; MG/1
875 TABLET, FILM COATED ORAL 2 TIMES DAILY
Qty: 10 TABLET | Refills: 0 | Status: SHIPPED | OUTPATIENT
Start: 2025-04-08 | End: 2025-04-13

## 2025-04-08 RX ORDER — BENZONATATE 200 MG/1
200 CAPSULE ORAL 3 TIMES DAILY PRN
Qty: 30 CAPSULE | Refills: 0 | Status: SHIPPED | OUTPATIENT
Start: 2025-04-08 | End: 2025-04-15

## 2025-04-08 ASSESSMENT — ENCOUNTER SYMPTOMS
CHILLS: 1
SORE THROAT: 1

## 2025-04-08 NOTE — PROGRESS NOTES
"Subjective   Patient ID: Raji Duggan is a 74 y.o. male. They present today with a chief complaint of Sore Throat and Generalized Body Aches (X 5 days ).    History of Present Illness  HPI    Past Medical History  Allergies as of 04/08/2025    (No Known Allergies)       (Not in a hospital admission)       Past Medical History:   Diagnosis Date    Benign prostatic hyperplasia with lower urinary tract symptoms     GERD (gastroesophageal reflux disease)     Hyperlipidemia     Hypertension     Inguinal hernia     Knee pain     chronic right knee    Plantar fasciitis        Past Surgical History:   Procedure Laterality Date    COLONOSCOPY  07/01/2016    Complete Colonoscopy    FOOT SURGERY  03/07/2014    Foot Repair    OTHER SURGICAL HISTORY  03/07/2014    Primary Repair Of Ruptured Achilles Tendon    SHOULDER SURGERY  03/07/2014    Shoulder Surgery    TONSILLECTOMY  03/07/2014    Tonsillectomy        reports that he has never smoked. He has never been exposed to tobacco smoke. He has never used smokeless tobacco. He reports current alcohol use. He reports that he does not use drugs.    Review of Systems  Review of Systems   Constitutional:  Positive for chills.   HENT:  Positive for sore throat.    All other systems reviewed and are negative.                                 Objective    Vitals:    04/08/25 1133   BP: 132/79   Pulse: 50   Resp: 16   Temp: 36.6 °C (97.8 °F)   SpO2: 99%   Weight: 81.6 kg (180 lb)   Height: 1.803 m (5' 11\")     No LMP for male patient.    Physical Exam  Vitals and nursing note reviewed.   Constitutional:       Appearance: Normal appearance.   HENT:      Head: Normocephalic.      Right Ear: Ear canal and external ear normal. Tympanic membrane is injected. Tympanic membrane has decreased mobility.      Left Ear: Ear canal and external ear normal. Tympanic membrane is injected. Tympanic membrane has decreased mobility.      Nose: Congestion present.      Right Turbinates: Enlarged.      Left " Turbinates: Enlarged.      Right Sinus: Frontal sinus tenderness present.      Left Sinus: Frontal sinus tenderness present.      Mouth/Throat:      Mouth: Mucous membranes are moist.      Pharynx: Oropharynx is clear. Uvula midline. Postnasal drip present.      Tonsils: No tonsillar exudate or tonsillar abscesses. 0 on the right. 0 on the left.   Eyes:      Extraocular Movements: Extraocular movements intact.      Pupils: Pupils are equal, round, and reactive to light.   Cardiovascular:      Rate and Rhythm: Normal rate and regular rhythm.      Pulses: Normal pulses.      Heart sounds: Normal heart sounds.   Pulmonary:      Effort: Pulmonary effort is normal.      Breath sounds: Normal breath sounds.   Musculoskeletal:         General: Normal range of motion.      Cervical back: Normal range of motion and neck supple.   Skin:     General: Skin is warm and dry.   Neurological:      General: No focal deficit present.      Mental Status: He is alert and oriented to person, place, and time.   Psychiatric:         Mood and Affect: Mood normal.         Behavior: Behavior normal.         Procedures    Point of Care Test & Imaging Results from this visit  Results for orders placed or performed in visit on 04/08/25   POCT SPOTFIRE R/ST Panel Mini w/Strep A ([facility]) manually resulted   Result Value Ref Range    POC Group A Strep, PCR Negative Negative    POC Respiratory Syncytial Virus PCR Negative Negative    POC Influenza A Virus PCR Negative Negative    POC Influenza B Virus PCR Negative Negative    POC Human Rhinovirus PCR Negative Negative      Imaging  No results found.    Cardiology, Vascular, and Other Imaging  No other imaging results found for the past 2 days      Diagnostic study results (if any) were reviewed by MARICRUZ Pino.    Assessment/Plan   Allergies, medications, history, and pertinent labs/EKGs/Imaging reviewed by MARICRUZ Pino.     Medical Decision Making  Raji Duggan is a  74 y.o. male with history of nephrolithiasis, and BPH s/p HoLEP  (05/23/2024); 36.4g benign tissue removed presents with sore throat, bodyaches x 5 days. Denies dyspnea, CP, n/v, HA, dizziness    Spotfire strep- NEG    Pt managing secretions, airway intact. There or no signs of PTA/TA, trismus, retropharyngeal abscess or epiglotitis. No signs of osteomyelitis, orbital cellulitis or other complications of sinusitis at this point in time. SpO2 >94% on RA. There is no reported SOB, CP, CURRY, pleuritic pain, fever. Clinical suspicions of pneumonia or other cardiorespiratory catastrophe at this time are low. Pt is ambulating without difficulty showing no signs of hypoxia. Given the pt underlying risk factors, and exam will err on the side of caution and treat for bacterial sinusitis with augmentin . Pt well-hydrated, nontoxic and there no signs of clinical deterioration. Patient well hydrated, neurovascularly intact. Advised normal saline mist spray TID, flonase daily, antihistamine daily, vit d3/c/zinc/elderberry, probiotics for gut health, and if s/s persist after 48 hours to f/u PCP  Pt is going on a trip out of country, so advised pt to start with mucinex BID and tessalon perles with normal saline mist spray TID, if it doesnt work and s/s worsen in 48-72, start augmentin and complete      Follow up Care: Pt instructed to follow-up with PCP or other appropriate clinician within 24 to 48 hours. Report to ED if there is any development in worsening pain, difficulty swallowing, change in phonation, fever, chills, neck pain, photophobia, headache, neck stiffness, chest pain, abdominal pain, vomiting, syncope, hemoptysis, leg swelling SOB, fever, facial swelling, eye pain, periorbital swelling/erythema, or any new signs or sx.   - Symptomatic treatment with prn analgesia  - Supportive care with fluids and rest  - The patient may also use OTC decongestants prn, OTC cough and cold meds as needed, warm salt water gargles,  throat lozenges and/or OTC throat spray as needed, and nasal saline gtts and suction prn.  - Follow up in  1 week  if symptoms persist or sooner if worsening of symptoms  - Pain relievers (tylenol) for relief of headache, body aches, malaise, muscle and joint pain, ear ache  - Rest, increased fluids, frequent hand washing  - RTC if symptoms persist, worsen or proceed to ER for symptoms of increased SOB, chest pain, resp distress, high fever, pain with breathing, etc.   - The patient voiced understanding and agreed with the plan.      The patient was educated regarding diagnosis, supportive care, OTC and Rx medications. The patient was given the opportunity to ask questions prior to discharge. They verbalized understanding of my discussion of the plans for treatment, expected course, indications to return to  or seek further evaluation in ED, and the need for timely follow up as directed.     Can try Sambucol Black Elderberry Syrup and Extra Vitamin C and Zinc Lozenges (lozenges only if over 3 years of age)      1. **Stay Home**: Individuals who are sick should stay home for at least 24 hours after their fever has subsided without the use of fever-reducing medications.  2. **Avoid Close Contact**: Sick individuals should avoid close contact with others to prevent spreading the virus.  3. **Hygiene Practices**: Frequent handwashing, using hand sanitizers, and covering coughs and sneezes are encouraged to reduce transmission.  4. **Vaccination**: Annual flu vaccines are recommended for most individuals to help prevent illness          Orders and Diagnoses  Diagnoses and all orders for this visit:  Sore throat  -     POCT SPOTFIRE R/ST Panel Mini w/Strep A ([facility]) manually resulted  Subacute frontal sinusitis  -     guaiFENesin (Mucinex) 1,200 mg tablet extended release 12hr; Take 1 tablet (1,200 mg) by mouth every 12 hours if needed (cough) for up to 7 days.  -     benzonatate (Tessalon) 200 mg capsule; Take 1  capsule (200 mg) by mouth 3 times a day as needed for cough for up to 7 days. Do not crush or chew.  -     amoxicillin-pot clavulanate (Augmentin) 875-125 mg tablet; Take 1 tablet (875 mg) by mouth 2 times a day for 5 days.      Medical Admin Record      Patient disposition: Home    Electronically signed by MARICRUZ Pino  12:20 PM

## 2025-06-25 ENCOUNTER — APPOINTMENT (OUTPATIENT)
Dept: DERMATOLOGY | Facility: CLINIC | Age: 75
End: 2025-06-25
Payer: MEDICARE

## 2025-06-25 VITALS — HEART RATE: 45 BPM | DIASTOLIC BLOOD PRESSURE: 82 MMHG | SYSTOLIC BLOOD PRESSURE: 149 MMHG

## 2025-06-25 DIAGNOSIS — C44.42 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: ICD-10-CM

## 2025-06-25 PROCEDURE — 17312 MOHS ADDL STAGE: CPT | Performed by: DERMATOLOGY

## 2025-06-25 PROCEDURE — 17311 MOHS 1 STAGE H/N/HF/G: CPT | Performed by: DERMATOLOGY

## 2025-06-25 PROCEDURE — 99204 OFFICE O/P NEW MOD 45 MIN: CPT | Performed by: DERMATOLOGY

## 2025-06-25 RX ORDER — ATORVASTATIN CALCIUM 20 MG/1
20 TABLET, FILM COATED ORAL DAILY
COMMUNITY

## 2025-06-25 NOTE — PROGRESS NOTES
Mohs Surgery Operative Note    Date of Surgery:  6/25/2025  Surgeon:  Christophe Tang MD  Office Location: [address]  [facility] [address]  Dept: [phone]  Dept Fax: [phone]  Referring Provider: Clarisse Sequeira MD  [id number]  [place] [address]      Assessment/Plan   Pre-procedure:   Obtained informed consent: written from patient  The surgical site was identified and confirmed with the patient.     Intra-operative:   Audible time out called at 8:55 AM 06/25/25  by: Armida Vora RN   Verified patient name, birthdate, site, specimen bottle label & requisition.    The planned procedure(s) was again reviewed with the patient. The risks of bleeding, infection, nerve damage and scarring were reviewed. Written authorization was obtained. The patient identity, surgical site, and planned procedure(s) were verified. The provider acted as both surgeon and pathologist.     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  Right Temporal Scalp 4:00 and 6:00  Mohs surgery    Consent obtained: written    Universal Protocol:  Procedure explained and questions answered to patient or proxy's satisfaction: Yes    Test results available and properly labeled: Yes    Pathology report reviewed: Yes    External notes reviewed: Yes    Photo or diagram used for site identification: Yes    Site/side marked: Yes    Slide independently reviewed by Mohs surgeon: Yes    Immediately prior to procedure a time out was called: Yes    Patient identity confirmed: verbally with patient  Preparation: Patient was prepped and draped in usual sterile fashion      Anticoagulation:  Is the patient taking prescription anticoagulant and/or aspirin prescribed/recommended by a physician? No    Was the anticoagulation regimen changed prior to Mohs? No      Anesthesia:  Anesthesia method: local infiltration  Local anesthetic: lidocaine 1% WITH epi    Procedure Details:  Case  ID Number: [id number]  Biopsy accession number: [id number]  Date of biopsy: 5/28/2025  Pre-Op diagnosis: squamous cell carcinoma  Surgical site (from skin exam): Right Temporal Scalp 4:00 and 6:00  Pre-operative length (cm): 2  Pre-operative width (cm): 1.9  Indications for Mohs surgery: anatomic location where tissue conservation is critical  Previously treated? No      Micrographic Surgery Details:  Post-operative length (cm): 3.9  Post-operative width (cm): 3.8  Number of Mohs stages: 4    Stage 1     Comments: The patient was brought into the operating room and placed in the procedure chair in the appropriate position.  The area positive by previous biopsy was identified and confirmed with the patient. The area of clinically obvious tumor was debulked using a curette and/or scalpel as needed. An incision was made following the Mohs approach through the skin. The specimen was taken to the lab, divided into 2 piece(s) and appropriately chromacoded and processed.    Tumor was seen on both the lateral and deep margins as indicated on the on the Mohs map.  Squamous cell carcinoma in situ. Histologic examination revealed enlarged, atypical keratinocytes with large nuclear to cytoplasmic ratio extending throughout the full thickness of an epidermis.          Tumor features identified on Mohs section: Squamous Cell Carcinoma in Situ      Depth of invasion: Epidermis    Stage 2     Comments: The area of positivity as noted on the Mohs map in the previous stage was identified and removed using the Mohs technique. The specimen was taken to the lab and appropriately chromacoded and processed in 2 piece(s).    Tumor was seen on the lateral margins as indicated on the on the Mohs map.  Squamous cell carcinoma in situ. Histologic examination revealed enlarged, atypical keratinocytes with large nuclear to cytoplasmic ratio extending throughout the full thickness of an epidermis.          Tumor features identified on Mohs section:  Squamous Cell Carcinoma in Situ     Depth of invasion: Epidermis    Stage 3     Comments: The area of positivity as noted on the Mohs map in the previous stage was identified and removed using the Mohs technique. The specimen was taken to the lab and appropriately chromacoded and processed in 4 piece(s).    Tumor was seen on the lateral margins as indicated on the on the Mohs map.  Squamous cell carcinoma in situ. Histologic examination revealed enlarged, atypical keratinocytes with large nuclear to cytoplasmic ratio extending throughout the full thickness of an epidermis.          Tumor features identified on Mohs section: Squamous Cell Carcinoma in Situ     Depth of invasion: Epidermis    Stage 4     Comments: The area of positivity as noted on the Mohs map in the previous stage was identified and removed using the Mohs technique. The specimen was taken to the lab and appropriately chromacoded and processed in 2 piece(s).     Tumor features identified on Mohs section: no tumor identified    Depth of defect: subcutaneous fat    Patient tolerance of procedure: tolerated well, no immediate complications    Reconstruction:  Was the defect reconstructed?: No     Repair: After a discussion with the patient regarding the options for wound closure, a decision was made to proceed with second intention healing.    Dressing/Follow-up: Surgifoam was placed in the wound. A pressure dressing was placed to help stabilize the wound and to minimize the risk of postoperative bleeding. Wound care was discussed, and the patient was given written post-operative wound care instructions.        Fine/surface layer approximation (top stitches)   Hemostasis achieved with: electrodesiccation  Outcome: patient tolerated procedure well with no complications    Post-procedure details: sterile dressing applied and wound care instructions given    Dressing type: pressure dressing    Discussion/Procedural Comments:  During Mohs procedure, the two  adjacent biopsy sites were encompassed within the same surgical margin, resulting in a single combined defect.      The final repair measured 3.9 x 3.8 cm        Wound care was discussed, and the patient was given written post-operative wound care instructions.      The patient will follow up with Christophe Tang MD as needed for any post operative problems or concerns, and will follow up with their primary dermatologist as scheduled.       IArmida RN am scribing for, and in the presence of Christophe Tang MD

## 2025-06-25 NOTE — PROGRESS NOTES
Office Visit Note  Date: 6/25/2025  Surgeon:  Christophe Tang MD  Office Location:  [address]     [facility] [address]  Dept: [phone]  Dept Fax: [phone]  Referring Provider: Clraisse Sequeira MD  [id number]  [place] [address]    [facility]   Raji Duggan is a 74 y.o. male who presents for the following: (Right Temporal Scalp 4:00 and 6:00)    According to the patient, the lesion has been present for approximately greater than 1 year at the time of diagnosis.  The lesion is itchy.  The lesion has not been treated previously.    The patient does not have a pacemaker / defibrillator.  The patient does not have a heart valve / joint replacement.    The patient is not on blood thinners.  The patient does not have a history of hepatitis B or C.  The patient does not have a history of HIV.  The patient does not have a history of immunosuppression (e.g. organ transplantation, malignancy, medications)    Review of Systems:  No other skin or systemic complaints other than what is documented elsewhere in the note.    MEDICAL HISTORY: clinically relevant history including significant past medical history, medications and allergies was reviewed and documented in Epic.    Objective   Well appearing patient in no apparent distress; mood and affect are within normal limits.  Vital signs: See record.  Noted on the   Right Temporal Scalp 4:00 and 6:00  Is a 2.0 x 1.9 cm scar    The patient confirmed the identified site.    Discussion:  The nature of the diagnosis was explained. The lesion is a skin cancer.  It has a risk of local growth and distant spread. The condition is associated with sun exposure.  Warning signs of non-melanoma skin cancer discussed. Patient was instructed to perform monthly self skin examination.  We recommended that the patient have regular full skin exams given an increased risk of subsequent skin cancers. The patient was  instructed to use sun protective behaviors including use of broad spectrum sunscreens and sun protective clothing to reduce risk of skin cancers.      Risks, benefits, side effects of Mohs surgery were discussed with patient and the patient voiced understanding.  It was explained that even though the cure rate of Mohs is very high it is not 100%. Risks of surgery including but not limited to bleeding, infection, numbness, nerve damage, and scar were reviewed.  Discussion included wound care requirements, activity restrictions, likely scar outcome and time to heal.     After Mohs surgery, the defect may need to be repaired surgically and the scar may be longer than the original lesion.  Reconstruction options, risks, and benefits were reviewed including second intention healing, linear repair (4-1 ratio was explained), local flaps, skin grafts, cartilage grafts and interpolation flaps (the need for multiple surgeries was explained). Possible outcomes were reviewed including likely scar appearance, failure of flap survival, infection, bleeding and the need for revision surgery.     The pathology was reviewed, the photograph was reviewed, and the referring physician's note was reviewed.    Patient elected for Mohs surgery.  The patient has a squamous cell carcinoma.  The pathology was reviewed, the photograph was reviewed, and the referring physicians note was reviewed.  Multiple treatment options including mohs surgery (which has moderate risk of morbidity) were reviewed.    Medical Decision Making  Column 1- Squamous Cell Carcinoma (1 acute uncomplicated illness- Low)  Column 2- 3 tests reviewed (pathology, photograph, referring physician notes- Moderate)  Column 3- Modertate risk of morbidity from additional treatment- mohs surgry- Moderate)    Overall Moderate MDM      Armida LEGER am scribing for, and in the presence of Christophe Tang MD

## 2025-10-08 ENCOUNTER — APPOINTMENT (OUTPATIENT)
Dept: UROLOGY | Facility: CLINIC | Age: 75
End: 2025-10-08
Payer: MEDICARE

## (undated) DEVICE — TUBING, ELLIK, FOR ELLIK/TOOMEY ADAPTERS

## (undated) DEVICE — GLOVE, PROTEXIS PI CLASSIC, SZ-6.5, PF, LF

## (undated) DEVICE — UROVAC BLADDER EVACUATOR

## (undated) DEVICE — TUBING, MORCELLATOR PUMP, DISPOSABLE

## (undated) DEVICE — Device

## (undated) DEVICE — BLADE, ROTATION MORCELLATOR, 4.8MM X 335MM, PIRHANA, DISPOSABLE

## (undated) DEVICE — SYRINGE, TOOMEY, IRRIGATION, 60ML, INDIVIDUAL WRAP, STERILE

## (undated) DEVICE — GUIDEWIRE, DUAL SENSOR, .035 X 150 STRAIGHT,  3CM

## (undated) DEVICE — GOWN, SURGICAL, SIRUS, NON REINFORCED, LARGE

## (undated) DEVICE — CATHETER, LASER URETERAL, 7.1FR, 40CM

## (undated) DEVICE — IRRIGATION SET, CYSTOSCOPY, TURP, Y, CONTINUOUS, 81 IN

## (undated) DEVICE — SYRINGE, 20 CC, LUER LOCK

## (undated) DEVICE — CATHETER, FOLEY, 3WAY, 22FR, 30CC, HEMATURIA,  LATEX